# Patient Record
Sex: MALE | Race: ASIAN | NOT HISPANIC OR LATINO
[De-identification: names, ages, dates, MRNs, and addresses within clinical notes are randomized per-mention and may not be internally consistent; named-entity substitution may affect disease eponyms.]

---

## 2024-05-09 ENCOUNTER — RESULT REVIEW (OUTPATIENT)
Age: 39
End: 2024-05-09

## 2024-05-09 ENCOUNTER — INPATIENT (INPATIENT)
Facility: HOSPITAL | Age: 39
LOS: 14 days | Discharge: HOME | End: 2024-05-24
Attending: OTOLARYNGOLOGY | Admitting: OTOLARYNGOLOGY
Payer: MEDICAID

## 2024-05-09 ENCOUNTER — EMERGENCY (EMERGENCY)
Facility: HOSPITAL | Age: 39
LOS: 1 days | Discharge: ANOTHER HOSPITAL | End: 2024-05-09
Attending: EMERGENCY MEDICINE
Payer: COMMERCIAL

## 2024-05-09 VITALS
DIASTOLIC BLOOD PRESSURE: 67 MMHG | WEIGHT: 147.71 LBS | OXYGEN SATURATION: 98 % | TEMPERATURE: 98 F | RESPIRATION RATE: 17 BRPM | HEART RATE: 70 BPM | SYSTOLIC BLOOD PRESSURE: 116 MMHG | HEIGHT: 68 IN

## 2024-05-09 VITALS
DIASTOLIC BLOOD PRESSURE: 75 MMHG | RESPIRATION RATE: 16 BRPM | HEART RATE: 60 BPM | SYSTOLIC BLOOD PRESSURE: 128 MMHG | TEMPERATURE: 98 F | OXYGEN SATURATION: 98 %

## 2024-05-09 VITALS
SYSTOLIC BLOOD PRESSURE: 124 MMHG | OXYGEN SATURATION: 99 % | TEMPERATURE: 98 F | DIASTOLIC BLOOD PRESSURE: 79 MMHG | RESPIRATION RATE: 18 BRPM | HEART RATE: 66 BPM

## 2024-05-09 DIAGNOSIS — R22.0 LOCALIZED SWELLING, MASS AND LUMP, HEAD: ICD-10-CM

## 2024-05-09 LAB
ALBUMIN SERPL ELPH-MCNC: 3.6 G/DL — SIGNIFICANT CHANGE UP (ref 3.5–5)
ALBUMIN SERPL ELPH-MCNC: 4.4 G/DL — SIGNIFICANT CHANGE UP (ref 3.3–5)
ALP SERPL-CCNC: 87 U/L — SIGNIFICANT CHANGE UP (ref 40–120)
ALP SERPL-CCNC: 90 U/L — SIGNIFICANT CHANGE UP (ref 40–120)
ALT FLD-CCNC: 27 U/L — SIGNIFICANT CHANGE UP (ref 4–41)
ALT FLD-CCNC: 37 U/L DA — SIGNIFICANT CHANGE UP (ref 10–60)
ANION GAP SERPL CALC-SCNC: 13 MMOL/L — SIGNIFICANT CHANGE UP (ref 7–14)
ANION GAP SERPL CALC-SCNC: 4 MMOL/L — LOW (ref 5–17)
APTT BLD: 33.7 SEC — SIGNIFICANT CHANGE UP (ref 24.5–35.6)
AST SERPL-CCNC: 20 U/L — SIGNIFICANT CHANGE UP (ref 10–40)
AST SERPL-CCNC: 21 U/L — SIGNIFICANT CHANGE UP (ref 4–40)
BASOPHILS # BLD AUTO: 0.03 K/UL — SIGNIFICANT CHANGE UP (ref 0–0.2)
BASOPHILS # BLD AUTO: 0.03 K/UL — SIGNIFICANT CHANGE UP (ref 0–0.2)
BASOPHILS NFR BLD AUTO: 0.5 % — SIGNIFICANT CHANGE UP (ref 0–2)
BASOPHILS NFR BLD AUTO: 0.5 % — SIGNIFICANT CHANGE UP (ref 0–2)
BILIRUB SERPL-MCNC: 0.4 MG/DL — SIGNIFICANT CHANGE UP (ref 0.2–1.2)
BILIRUB SERPL-MCNC: 0.4 MG/DL — SIGNIFICANT CHANGE UP (ref 0.2–1.2)
BLD GP AB SCN SERPL QL: NEGATIVE — SIGNIFICANT CHANGE UP
BLD GP AB SCN SERPL QL: SIGNIFICANT CHANGE UP
BUN SERPL-MCNC: 7 MG/DL — SIGNIFICANT CHANGE UP (ref 7–23)
BUN SERPL-MCNC: 8 MG/DL — SIGNIFICANT CHANGE UP (ref 7–18)
CALCIUM SERPL-MCNC: 8.6 MG/DL — SIGNIFICANT CHANGE UP (ref 8.4–10.5)
CALCIUM SERPL-MCNC: 9 MG/DL — SIGNIFICANT CHANGE UP (ref 8.4–10.5)
CHLORIDE SERPL-SCNC: 109 MMOL/L — HIGH (ref 98–107)
CHLORIDE SERPL-SCNC: 113 MMOL/L — HIGH (ref 96–108)
CO2 SERPL-SCNC: 20 MMOL/L — LOW (ref 22–31)
CO2 SERPL-SCNC: 25 MMOL/L — SIGNIFICANT CHANGE UP (ref 22–31)
CREAT SERPL-MCNC: 0.85 MG/DL — SIGNIFICANT CHANGE UP (ref 0.5–1.3)
CREAT SERPL-MCNC: 0.95 MG/DL — SIGNIFICANT CHANGE UP (ref 0.5–1.3)
EGFR: 105 ML/MIN/1.73M2 — SIGNIFICANT CHANGE UP
EGFR: 114 ML/MIN/1.73M2 — SIGNIFICANT CHANGE UP
EOSINOPHIL # BLD AUTO: 0.11 K/UL — SIGNIFICANT CHANGE UP (ref 0–0.5)
EOSINOPHIL # BLD AUTO: 0.17 K/UL — SIGNIFICANT CHANGE UP (ref 0–0.5)
EOSINOPHIL NFR BLD AUTO: 1.8 % — SIGNIFICANT CHANGE UP (ref 0–6)
EOSINOPHIL NFR BLD AUTO: 2.6 % — SIGNIFICANT CHANGE UP (ref 0–6)
GLUCOSE SERPL-MCNC: 90 MG/DL — SIGNIFICANT CHANGE UP (ref 70–99)
GLUCOSE SERPL-MCNC: 98 MG/DL — SIGNIFICANT CHANGE UP (ref 70–99)
HCT VFR BLD CALC: 43.2 % — SIGNIFICANT CHANGE UP (ref 39–50)
HCT VFR BLD CALC: 43.9 % — SIGNIFICANT CHANGE UP (ref 39–50)
HGB BLD-MCNC: 13.7 G/DL — SIGNIFICANT CHANGE UP (ref 13–17)
HGB BLD-MCNC: 14.4 G/DL — SIGNIFICANT CHANGE UP (ref 13–17)
IANC: 3.41 K/UL — SIGNIFICANT CHANGE UP (ref 1.8–7.4)
IMM GRANULOCYTES NFR BLD AUTO: 0.2 % — SIGNIFICANT CHANGE UP (ref 0–0.9)
IMM GRANULOCYTES NFR BLD AUTO: 0.2 % — SIGNIFICANT CHANGE UP (ref 0–0.9)
INR BLD: 1.15 RATIO — SIGNIFICANT CHANGE UP (ref 0.85–1.18)
INR BLD: 1.34 RATIO — HIGH (ref 0.85–1.18)
LYMPHOCYTES # BLD AUTO: 1.85 K/UL — SIGNIFICANT CHANGE UP (ref 1–3.3)
LYMPHOCYTES # BLD AUTO: 1.88 K/UL — SIGNIFICANT CHANGE UP (ref 1–3.3)
LYMPHOCYTES # BLD AUTO: 28.6 % — SIGNIFICANT CHANGE UP (ref 13–44)
LYMPHOCYTES # BLD AUTO: 30.8 % — SIGNIFICANT CHANGE UP (ref 13–44)
MCHC RBC-ENTMCNC: 26.7 PG — LOW (ref 27–34)
MCHC RBC-ENTMCNC: 27.2 PG — SIGNIFICANT CHANGE UP (ref 27–34)
MCHC RBC-ENTMCNC: 31.7 GM/DL — LOW (ref 32–36)
MCHC RBC-ENTMCNC: 32.8 GM/DL — SIGNIFICANT CHANGE UP (ref 32–36)
MCV RBC AUTO: 82.8 FL — SIGNIFICANT CHANGE UP (ref 80–100)
MCV RBC AUTO: 84 FL — SIGNIFICANT CHANGE UP (ref 80–100)
MONOCYTES # BLD AUTO: 0.66 K/UL — SIGNIFICANT CHANGE UP (ref 0–0.9)
MONOCYTES # BLD AUTO: 0.72 K/UL — SIGNIFICANT CHANGE UP (ref 0–0.9)
MONOCYTES NFR BLD AUTO: 10.8 % — SIGNIFICANT CHANGE UP (ref 2–14)
MONOCYTES NFR BLD AUTO: 11.1 % — SIGNIFICANT CHANGE UP (ref 2–14)
NEUTROPHILS # BLD AUTO: 3.41 K/UL — SIGNIFICANT CHANGE UP (ref 1.8–7.4)
NEUTROPHILS # BLD AUTO: 3.69 K/UL — SIGNIFICANT CHANGE UP (ref 1.8–7.4)
NEUTROPHILS NFR BLD AUTO: 55.9 % — SIGNIFICANT CHANGE UP (ref 43–77)
NEUTROPHILS NFR BLD AUTO: 57 % — SIGNIFICANT CHANGE UP (ref 43–77)
NRBC # BLD: 0 /100 WBCS — SIGNIFICANT CHANGE UP (ref 0–0)
NRBC # BLD: 0 /100 WBCS — SIGNIFICANT CHANGE UP (ref 0–0)
NRBC # FLD: 0 K/UL — SIGNIFICANT CHANGE UP (ref 0–0)
PLATELET # BLD AUTO: 174 K/UL — SIGNIFICANT CHANGE UP (ref 150–400)
PLATELET # BLD AUTO: 190 K/UL — SIGNIFICANT CHANGE UP (ref 150–400)
POTASSIUM SERPL-MCNC: 3.9 MMOL/L — SIGNIFICANT CHANGE UP (ref 3.5–5.3)
POTASSIUM SERPL-MCNC: 4 MMOL/L — SIGNIFICANT CHANGE UP (ref 3.5–5.3)
POTASSIUM SERPL-SCNC: 3.9 MMOL/L — SIGNIFICANT CHANGE UP (ref 3.5–5.3)
POTASSIUM SERPL-SCNC: 4 MMOL/L — SIGNIFICANT CHANGE UP (ref 3.5–5.3)
PROT SERPL-MCNC: 7.1 G/DL — SIGNIFICANT CHANGE UP (ref 6–8.3)
PROT SERPL-MCNC: 7.1 G/DL — SIGNIFICANT CHANGE UP (ref 6–8.3)
PROTHROM AB SERPL-ACNC: 12.8 SEC — SIGNIFICANT CHANGE UP (ref 9.5–13)
PROTHROM AB SERPL-ACNC: 15.1 SEC — HIGH (ref 9.5–13)
RBC # BLD: 5.14 M/UL — SIGNIFICANT CHANGE UP (ref 4.2–5.8)
RBC # BLD: 5.3 M/UL — SIGNIFICANT CHANGE UP (ref 4.2–5.8)
RBC # FLD: 12.5 % — SIGNIFICANT CHANGE UP (ref 10.3–14.5)
RBC # FLD: 12.5 % — SIGNIFICANT CHANGE UP (ref 10.3–14.5)
RH IG SCN BLD-IMP: POSITIVE — SIGNIFICANT CHANGE UP
SODIUM SERPL-SCNC: 142 MMOL/L — SIGNIFICANT CHANGE UP (ref 135–145)
SODIUM SERPL-SCNC: 142 MMOL/L — SIGNIFICANT CHANGE UP (ref 135–145)
WBC # BLD: 6.1 K/UL — SIGNIFICANT CHANGE UP (ref 3.8–10.5)
WBC # BLD: 6.47 K/UL — SIGNIFICANT CHANGE UP (ref 3.8–10.5)
WBC # FLD AUTO: 6.1 K/UL — SIGNIFICANT CHANGE UP (ref 3.8–10.5)
WBC # FLD AUTO: 6.47 K/UL — SIGNIFICANT CHANGE UP (ref 3.8–10.5)

## 2024-05-09 PROCEDURE — 85025 COMPLETE CBC W/AUTO DIFF WBC: CPT

## 2024-05-09 PROCEDURE — 86900 BLOOD TYPING SEROLOGIC ABO: CPT

## 2024-05-09 PROCEDURE — 99285 EMERGENCY DEPT VISIT HI MDM: CPT

## 2024-05-09 PROCEDURE — 86850 RBC ANTIBODY SCREEN: CPT

## 2024-05-09 PROCEDURE — 80053 COMPREHEN METABOLIC PANEL: CPT

## 2024-05-09 PROCEDURE — 88305 TISSUE EXAM BY PATHOLOGIST: CPT | Mod: 26

## 2024-05-09 PROCEDURE — 86901 BLOOD TYPING SEROLOGIC RH(D): CPT

## 2024-05-09 PROCEDURE — 85610 PROTHROMBIN TIME: CPT

## 2024-05-09 PROCEDURE — 70491 CT SOFT TISSUE NECK W/DYE: CPT | Mod: 26,MC

## 2024-05-09 PROCEDURE — 71260 CT THORAX DX C+: CPT | Mod: 26

## 2024-05-09 PROCEDURE — 36415 COLL VENOUS BLD VENIPUNCTURE: CPT

## 2024-05-09 PROCEDURE — 88331 PATH CONSLTJ SURG 1 BLK 1SPC: CPT | Mod: 26

## 2024-05-09 PROCEDURE — 70491 CT SOFT TISSUE NECK W/DYE: CPT | Mod: MC

## 2024-05-09 RX ORDER — OXYCODONE HYDROCHLORIDE 5 MG/1
2.5 TABLET ORAL EVERY 6 HOURS
Refills: 0 | Status: DISCONTINUED | OUTPATIENT
Start: 2024-05-09 | End: 2024-05-12

## 2024-05-09 RX ORDER — ENOXAPARIN SODIUM 100 MG/ML
30 INJECTION SUBCUTANEOUS EVERY 24 HOURS
Refills: 0 | Status: DISCONTINUED | OUTPATIENT
Start: 2024-05-09 | End: 2024-05-09

## 2024-05-09 RX ORDER — ENOXAPARIN SODIUM 100 MG/ML
40 INJECTION SUBCUTANEOUS EVERY 24 HOURS
Refills: 0 | Status: DISCONTINUED | OUTPATIENT
Start: 2024-05-09 | End: 2024-05-16

## 2024-05-09 RX ORDER — ACETAMINOPHEN 500 MG
650 TABLET ORAL EVERY 6 HOURS
Refills: 0 | Status: DISCONTINUED | OUTPATIENT
Start: 2024-05-09 | End: 2024-05-12

## 2024-05-09 RX ORDER — LANOLIN ALCOHOL/MO/W.PET/CERES
3 CREAM (GRAM) TOPICAL AT BEDTIME
Refills: 0 | Status: DISCONTINUED | OUTPATIENT
Start: 2024-05-09 | End: 2024-05-16

## 2024-05-09 RX ORDER — OXYCODONE HYDROCHLORIDE 5 MG/1
5 TABLET ORAL EVERY 6 HOURS
Refills: 0 | Status: DISCONTINUED | OUTPATIENT
Start: 2024-05-09 | End: 2024-05-12

## 2024-05-09 RX ADMIN — OXYCODONE HYDROCHLORIDE 5 MILLIGRAM(S): 5 TABLET ORAL at 21:30

## 2024-05-09 RX ADMIN — OXYCODONE HYDROCHLORIDE 5 MILLIGRAM(S): 5 TABLET ORAL at 20:34

## 2024-05-09 NOTE — ED PROVIDER NOTE - OBJECTIVE STATEMENT
38 yr old male smoker presents to ed c/o over 2 weeks of left facial pain. pt went to hospital in [facility] and had dx of oral carcinoma of left. per outside note- dated 5/3/24- 1 month of left facial pain and trismus and had CT face- left buccal thickening with focal mass. no dental procedure.

## 2024-05-09 NOTE — ED PROVIDER NOTE - PROGRESS NOTE DETAILS
Cl[place]: pt ct done. spoke with ent as pt has no follow up and will sent to [facility]

## 2024-05-09 NOTE — H&P ADULT - NSHPLABSRESULTS_GEN_ALL_CORE
LABS:                        14.4   6.10  )-----------( 174      ( 09 May 2024 15:59 )             43.9     05-09    142  |  109<H>  |  x   ----------------------------<  x   3.9   |  x   |  x         PT/INR - ( 09 May 2024 15:59 )   PT: 12.8 sec;   INR: 1.15 ratio         PTT - ( 09 May 2024 15:59 )  PTT:33.7 sec

## 2024-05-09 NOTE — ED PROVIDER NOTE - OBJECTIVE STATEMENT
38yyo M transferred from [facility] for buccal mass. pt was presenting WITH  months of left facial pain, pain with chewing, was founf to have left buccal mass and was sent for ent eval, biopsy and admission  pt tolerating secretions and PO.   no fevers, + weight loss  has history of chewing tobacco   pt does not have insurance so would unlikely be able to fu as outpt so was transferred for further eval   [facility]  [id number]

## 2024-05-09 NOTE — ED ADULT TRIAGE NOTE - CHIEF COMPLAINT QUOTE
pt transferred from [facility] for ENT evaluation. pt c/o left facial pain x 1 month. CT scan showed left buccal thickening with focal mass.

## 2024-05-09 NOTE — ED ADULT NURSE REASSESSMENT NOTE - NS ED NURSE REASSESS COMMENT FT1
endorsed to Sofia RICKS for transfer to [facility]. Pt currently comfortable, in NAD. Will continue to monitor.

## 2024-05-09 NOTE — ED PROVIDER NOTE - CLINICAL SUMMARY MEDICAL DECISION MAKING FREE TEXT BOX
38 yr old male smoker presents to ed c/o over 2 weeks of left facial pain. pt went to hospital in [facility] and had dx of oral carcinoma of left. per outside note- dated 5/3/24- 1 month of left facial pain and trismus and had CT face- left buccal thickening with focal mass. no dental procedure.     parotid gland mass- ct, labs, ent

## 2024-05-09 NOTE — H&P ADULT - HISTORY OF PRESENT ILLNESS
BERNA BENDER  is a 38y Male with no significant PMH who presented to an OSH today for complaints of L sided jaw and face pain. Pt endorses this pain for the past 2 months but reports he was in [place] so did not seek care. Pt reports he has had difficulty eating as chewing is painful for him. Pt has difficulty opening his mouth. Pt does not follow with a PCP or dentist. He has no difficulty swallowing. Pt reports a 6-7kg weight loss over the past few months. Pt endorses using chewing tobacco and stopped 25 days ago. Pt used to drink alcohol but reports he does not any longer. CT Head and Neck showing mass involving buccal mucosa extending into the area of the parotid.

## 2024-05-09 NOTE — H&P ADULT - NSHPPHYSICALEXAM_GEN_ALL_CORE
PHYSICAL EXAMINATION:  General: well-developed, NAD  OU: EOMI; PERRL; no drainage or redness  AU: external ears normal  Nose: nares patent  Mouth: Trismus, L buccal ulcerative mass extending from lower gingiva to upper gingiva, involving retromolar trigone  Neck: trachea midline, no palpable LAD  Respiratory: unlabored respirations  Cardiovascular: regular rate  Gastrointestinal: Soft, nondistended  Extremities: No edema, warm and well perfused  Skin: No lesions; no rash

## 2024-05-09 NOTE — ED ADULT NURSE NOTE - OBJECTIVE STATEMENT
presents w 1 month L facial pain. presented to [place] ED found to have L facial mass on CT. referred here for ENT. denies fever/chills, n/v/d. no drainage. speaking in complete sentences, no drooling noted. ENT at bedside eval now.

## 2024-05-09 NOTE — ED PROVIDER NOTE - ENMT, MLM
Airway patent, Nasal mucosa clear. Mouth left parotid mass. able to open mouth 2 finger breath only. Throat has no vesicles, no oropharyngeal exudates and uvula is midline.

## 2024-05-09 NOTE — H&P ADULT - ASSESSMENT
BERNA BENDER  is a 38y Male with no significant PMH who presented to an OSH today for complaints of L sided jaw and face pain found to have ulcerative L sided buccal mucosal lesion that on frozen section was found to be invasive SCC.    Plan:  - Admit to ENT  - Plan for OR next week for resection  - Medicine consult for pre-op optimization  - Pre-op labs  - Fu CT chest   - Puree diet

## 2024-05-09 NOTE — ED ADULT NURSE NOTE - CHIEF COMPLAINT QUOTE
pt transferred from [facility] for ENT evaluation. pt c/o left facial pain x 1 month. CT scan showed left buccal thickening with focal mass.

## 2024-05-09 NOTE — ED PROVIDER NOTE - PHYSICAL EXAMINATION
Gen: Well appearing in NAD  Head: NC/AT  Neck: trachea midline  difficulty opening moth fully  ulcerated mass notd on left inner cheek   Resp:  No distress  Ext: no deformities  Neuro:  A&O appears non focal  Skin:  Warm and dry as visualized  Psych:  Normal affect and mood

## 2024-05-09 NOTE — ED ADULT NURSE NOTE - NSFALLUNIVINTERV_ED_ALL_ED
Ears: no ear pain and no hearing problems. Nose: no nasal congestion and no nasal drainage. Mouth/Throat: no dysphagia, no hoarseness and no throat pain. Neck: no lumps, no pain, no stiffness and no swollen glands. Bed/Stretcher in lowest position, wheels locked, appropriate side rails in place/Call bell, personal items and telephone in reach/Instruct patient to call for assistance before getting out of bed/chair/stretcher/Non-slip footwear applied when patient is off stretcher/[place] to call system/Physically safe environment - no spills, clutter or unnecessary equipment/Purposeful proactive rounding/Room/bathroom lighting operational, light cord in reach

## 2024-05-09 NOTE — ED ADULT NURSE NOTE - OBJECTIVE STATEMENT
pt presents co L cheek/facial swelling with slight discomfort. At baseline pt is AAOx3, speaking in clear and complete sentences.

## 2024-05-10 DIAGNOSIS — C41.1 MALIGNANT NEOPLASM OF MANDIBLE: ICD-10-CM

## 2024-05-10 PROCEDURE — 99222 1ST HOSP IP/OBS MODERATE 55: CPT

## 2024-05-10 PROCEDURE — 93010 ELECTROCARDIOGRAM REPORT: CPT

## 2024-05-10 PROCEDURE — 31575 DIAGNOSTIC LARYNGOSCOPY: CPT | Mod: GC

## 2024-05-10 RX ADMIN — ENOXAPARIN SODIUM 40 MILLIGRAM(S): 100 INJECTION SUBCUTANEOUS at 05:11

## 2024-05-10 NOTE — CONSULT NOTE ADULT - ASSESSMENT
37 yo M with no sig pmh with newly diagnosed invasive SCC with plan for resection and free flap by ENT next week.

## 2024-05-10 NOTE — CONSULT NOTE ADULT - PROBLEM SELECTOR RECOMMENDATION 9
- ENT planning for surgical resection with free flap next week  - On oxycodone for pain  - PREOP EVALUATION: Pt has no cardiac history, no recent anginal symptoms. Endorses tobacco use but not other sig risk factors. No acute ischemic changes on EKG, RCRI 0; Gonzalez 0%. Pt is at low risk of myocardial infarction or cardiac arrest, intraoperatively or up to 30 days post-op.  He is currently optimized for this surgery. No further testing needed - Newly diagnosed SCC of mandible. CT chest neg for mets  - ENT planning for surgical resection with free flap next week  - On oxycodone for pain  - PREOP EVALUATION: Pt has no cardiac history, no recent anginal symptoms. Endorses tobacco use but not other sig risk factors. No acute ischemic changes on EKG, RCRI 0; Gonzalez 0%. Pt is at low risk of myocardial infarction or cardiac arrest, intraoperatively or up to 30 days post-op.  He is currently optimized for this surgery. No further testing needed

## 2024-05-10 NOTE — CONSULT NOTE ADULT - SUBJECTIVE AND OBJECTIVE BOX
Patient is a 38y old  Male who presents with a chief complaint of     HPI:  39 yo M with no sig pmh initially presented at OSH with 1 month L sided jaw and face pain. He reports having difficulty chewing due to the pain and lost 7kg over the past few months. CT Head and Neck showing mass involving buccal mucosa extending into the area of the parotid, found to be invasive SCC. Pt admitted to ENT service for surgical resection and free flap next week. Medicine consulted for preop eval. Pt denies any cardiac history, no prior episode of chest pain or other anginal symptoms. Denies any limitation in exercise tolerance. No family history of heart disease      Allergies    Allergy Status Unknown    Intolerances        HOME MEDICATIONS: No home medication    MEDICATIONS  (STANDING):  enoxaparin Injectable 40 milliGRAM(s) SubCutaneous every 24 hours    MEDICATIONS  (PRN):  acetaminophen     Tablet .. 650 milliGRAM(s) Oral every 6 hours PRN Mild Pain (1 - 3)  melatonin 3 milliGRAM(s) Oral at bedtime PRN Sleep  oxyCODONE    IR 5 milliGRAM(s) Oral every 6 hours PRN Severe Pain (7 - 10)  oxyCODONE    IR 2.5 milliGRAM(s) Oral every 6 hours PRN Moderate Pain (4 - 6)      PAST MEDICAL & SURGICAL HISTORY:  No pertinent past medical history      [ ] Reviewed     SOCIAL HISTORY:  Residence: [ ] [facility]  [ ] SNF  [x ] Community  [ ] Substance abuse: No  [ ] Tobacco: Pt chews tobacco daily  [ ] Alcohol use: No, last drink 15 days ago  Lives with friends   Not employed       FAMILY HISTORY:  [ x] No pertinent family history in first degree relatives   No family h/o heart disease or stroke    REVIEW OF SYSTEMS:    CONSTITUTIONAL: No fever, weight loss, or fatigue  EYES: No eye pain, visual disturbances, or discharge  ENMT:  No difficulty hearing, tinnitus, vertigo; No sinus or throat pain  NECK: No pain or stiffness  BREASTS: No pain, masses, or nipple discharge  RESPIRATORY: No cough, wheezing, chills or hemoptysis; No shortness of breath  CARDIOVASCULAR: No chest pain, palpitations, dizziness, or leg swelling  GASTROINTESTINAL: No abdominal or epigastric pain. No nausea, vomiting, or hematemesis; No diarrhea or constipation. No melena or hematochezia.  GENITOURINARY: No dysuria, frequency, hematuria, or incontinence  NEUROLOGICAL: No headaches, memory loss, loss of strength, numbness, or tremors  SKIN: No itching, burning, rashes, or lesions   LYMPH NODES: No enlarged glands  ENDOCRINE: No heat or cold intolerance; No hair loss  MUSCULOSKELETAL: No muscle or back pain  PSYCHIATRIC: No depression, anxiety, mood swings, or difficulty sleeping  HEME/LYMPH: No easy bruising, or bleeding gums  ALLERGY AND IMMUNOLOGIC: No hives or eczema    [  ] All other ROS negative  [  ] Unable to obtain due to poor mental status    Vital Signs Last 24 Hrs  T(C): 36.9 (10 May 2024 10:00), Max: 37 (10 May 2024 05:10)  T(F): 98.5 (10 May 2024 10:00), Max: 98.6 (10 May 2024 05:10)  HR: 72 (10 May 2024 10:00) (66 - 75)  BP: 120/82 (10 May 2024 10:00) (104/63 - 124/79)  BP(mean): --  RR: 18 (10 May 2024 10:00) (16 - 18)  SpO2: 100% (10 May 2024 10:00) (97% - 100%)    Parameters below as of 10 May 2024 10:00  Patient On (Oxygen Delivery Method): room air        PHYSICAL EXAM:    GENERAL: NAD, well-groomed, well-developed  HEAD:  Atraumatic, Normocephalic  EYES: EOMI, PERRLA, conjunctiva and sclera clear  ENMT: Moist mucous membranes  NECK: Supple, No JVD  RESPIRATORY: Clear to auscultation bilaterally; No rales, rhonchi, wheezing, or rubs  CARDIOVASCULAR: Regular rate and rhythm; No murmurs, rubs, or gallops  GASTROINTESTINAL: Soft, Nontender, Nondistended; Bowel sounds present  GENITOURINARY: Not examined  EXTREMITIES:  2+ Peripheral Pulses, No clubbing, cyanosis, or edema  NERVOUS SYSTEM:  Alert & Oriented X3; Moving all 4 extremities; No gross sensory deficits  HEME/LYMPH: No lymphadenopathy noted  SKIN: No rashes or lesions; Incisions C/D/I    LABS:                        14.4   6.10  )-----------( 174      ( 09 May 2024 15:59 )             43.9     Hemoglobin: 14.4 g/dL (05-09 @ 15:59)    05-09    142  |  109<H>  |  7   ----------------------------<  90  3.9   |  20<L>  |  0.85    Ca    9.0      09 May 2024 15:59    TPro  7.1  /  Alb  4.4  /  TBili  0.4  /  DBili  x   /  AST  21  /  ALT  27  /  AlkPhos  90  05-09    PT/INR - ( 09 May 2024 15:59 )   PT: 12.8 sec;   INR: 1.15 ratio         PTT - ( 09 May 2024 15:59 )  PTT:33.7 sec  Urinalysis Basic - ( 09 May 2024 15:59 )    Color: x / Appearance: x / SG: x / pH: x  Gluc: 90 mg/dL / Ketone: x  / Bili: x / Urobili: x   Blood: x / Protein: x / Nitrite: x   Leuk Esterase: x / RBC: x / WBC x   Sq Epi: x / Non Sq Epi: x / Bacteria: x      CAPILLARY BLOOD GLUCOSE        Microbiology   RADIOLOGY & ADDITIONAL STUDIES:    EKG:   Personally Reviewed:  [ ] YES     Imaging:   Personally Reviewed:  [ ] YES               [ ] Consultant(s) Notes Reviewed  [ ] Care Discussed with Consultants/Other Providers:    Advanced Directives: [ ] DNR  [ ] No feeding tube  [ ] MOLST in chart  [ ] MOLST completed today  [ ] Unknown    [ ] Probable osteoporosis  [ ] Possible osteomalacia  [ ] Increased delirium risk  [ ] Delirium and other risks can be reduced by:          -early ambulation          -minimizing "tethers" - IV, oxygen, catheters, etc          -avoiding hypnotics and sedatives          -maintaining hydration/nutrition          -avoid anticholinergics - diphenhydramine, etc          -pain control          -supportive environment

## 2024-05-10 NOTE — PROGRESS NOTE ADULT - SUBJECTIVE AND OBJECTIVE BOX
OTOLARYNGOLOGY (ENT) PROGRESS NOTE    PATIENT: BERNA BENDER  MRN: [id number]  : [birth year]  [id number]  DATE OF SERVICE:  05-10-24  			         ID:BERNA BENDER is a 38yMale with invasive SCC of the L buccal mucosa. Pt doing well with no issues at this time.      ALLERGIES:  Allergy Status Unknown      MEDICATIONS:  Antiinfectives:     IV fluids:    Hematologic/Anticoagulation:  enoxaparin Injectable 40 milliGRAM(s) SubCutaneous every 24 hours    Pain medications/Neuro:  acetaminophen     Tablet .. 650 milliGRAM(s) Oral every 6 hours PRN  melatonin 3 milliGRAM(s) Oral at bedtime PRN  oxyCODONE    IR 5 milliGRAM(s) Oral every 6 hours PRN  oxyCODONE    IR 2.5 milliGRAM(s) Oral every 6 hours PRN    Endocrine Medications:     All other standing medications:     All other PRN medications:    Vital Signs Last 24 Hrs  T(C): 37 (10 May 2024 05:10), Max: 37 (10 May 2024 05:10)  T(F): 98.6 (10 May 2024 05:10), Max: 98.6 (10 May 2024 05:10)  HR: 69 (10 May 2024 05:10) (66 - 75)  BP: 104/63 (10 May 2024 05:10) (104/63 - 124/79)  BP(mean): --  RR: 17 (10 May 2024 05:10) (16 - 18)  SpO2: 100% (10 May 2024 05:10) (97% - 100%)    Parameters below as of 10 May 2024 05:10  Patient On (Oxygen Delivery Method): room air           @ 07:01  -  05-10 @ 06:51  --------------------------------------------------------  IN:    Oral Fluid: 240 mL  Total IN: 240 mL    OUT:  Total OUT: 0 mL    Total NET: 240 mL                  PHYSICAL EXAM:  General: well-developed, NAD  OU: EOMI; PERRL; no drainage or redness  AU: external ears normal  Nose: nares patent  Mouth: L buccal mucosa ulcerative lesion, trismus  Neck: trachea midline  Respiratory: unlabored respirations  Cardiovascular: regular rate  Gastrointestinal: Soft, nondistended  Extremities: No edema, warm and well perfused  Skin: No lesions; no rash         LABS                       14.4   6.10  )-----------( 174      ( 09 May 2024 15:59 )             43.9        142  |  109<H>  |  7   ----------------------------<  90  3.9   |  20<L>  |  0.85    Ca    9.0      09 May 2024 15:59    TPro  7.1  /  Alb  4.4  /  TBili  0.4  /  DBili  x   /  AST  21  /  ALT  27  /  AlkPhos  90           Coagulation Studies-   PT/INR - ( 09 May 2024 15:59 )   PT: 12.8 sec;   INR: 1.15 ratio         PTT - ( 09 May 2024 15:59 )  PTT:33.7 sec  Urinalysis Basic - ( 09 May 2024 15:59 )    Color: x / Appearance: x / SG: x / pH: x  Gluc: 90 mg/dL / Ketone: x  / Bili: x / Urobili: x   Blood: x / Protein: x / Nitrite: x   Leuk Esterase: x / RBC: x / WBC x   Sq Epi: x / Non Sq Epi: x / Bacteria: x      Endocrine Panel-  Calcium: 9.0 mg/dL ( @ 15:59)        Assessment and Plan:  BERNA BENDER is a 38yMale w invasive SCC of L buccal mucosa.    PLAN:  - Medicine for pre-op clearance  - Follow-up tumor board discussion  - Plan for resection and free flap next week

## 2024-05-11 RX ADMIN — OXYCODONE HYDROCHLORIDE 5 MILLIGRAM(S): 5 TABLET ORAL at 06:46

## 2024-05-11 RX ADMIN — ENOXAPARIN SODIUM 40 MILLIGRAM(S): 100 INJECTION SUBCUTANEOUS at 05:20

## 2024-05-11 RX ADMIN — OXYCODONE HYDROCHLORIDE 2.5 MILLIGRAM(S): 5 TABLET ORAL at 01:13

## 2024-05-11 RX ADMIN — OXYCODONE HYDROCHLORIDE 2.5 MILLIGRAM(S): 5 TABLET ORAL at 00:43

## 2024-05-11 RX ADMIN — OXYCODONE HYDROCHLORIDE 5 MILLIGRAM(S): 5 TABLET ORAL at 07:16

## 2024-05-11 NOTE — PROGRESS NOTE ADULT - ASSESSMENT
37y/o M with newly diagnosed buccal SCC pending OR next week.   - CM/SW  - diet as tolerated   - OOBTC

## 2024-05-11 NOTE — PROGRESS NOTE ADULT - SUBJECTIVE AND OBJECTIVE BOX
ENT Progress Note    Interval: plan for OR next week, needs CM/SW for insurance       MEDICATIONS  (STANDING):  enoxaparin Injectable 40 milliGRAM(s) SubCutaneous every 24 hours    MEDICATIONS  (PRN):  acetaminophen     Tablet .. 650 milliGRAM(s) Oral every 6 hours PRN Mild Pain (1 - 3)  melatonin 3 milliGRAM(s) Oral at bedtime PRN Sleep  oxyCODONE    IR 5 milliGRAM(s) Oral every 6 hours PRN Severe Pain (7 - 10)  oxyCODONE    IR 2.5 milliGRAM(s) Oral every 6 hours PRN Moderate Pain (4 - 6)        Vital Signs Last 24 Hrs  T(C): 36.7 (11 May 2024 06:06), Max: 36.9 (10 May 2024 10:00)  T(F): 98.1 (11 May 2024 06:06), Max: 98.5 (10 May 2024 10:00)  HR: 68 (11 May 2024 06:06) (63 - 76)  BP: 116/67 (11 May 2024 06:06) (110/78 - 132/82)  BP(mean): --  RR: 18 (11 May 2024 06:06) (17 - 18)  SpO2: 98% (11 May 2024 06:06) (98% - 100%)    Parameters below as of 11 May 2024 06:06  Patient On (Oxygen Delivery Method): room air        Physical Exam:  General: well-developed, NAD  Mouth: L buccal mucosa ulcerative lesion, trismus  Neck: trachea midline  Respiratory: unlabored respirations      05-10-24 @ 07:01  -  05-11-24 @ 07:00  --------------------------------------------------------  IN: 740 mL / OUT: 0 mL / NET: 740 mL                              14.4   6.10  )-----------( 174      ( 09 May 2024 15:59 )             43.9    05-09    142  |  109<H>  |  7   ----------------------------<  90  3.9   |  20<L>  |  0.85    Ca    9.0      09 May 2024 15:59    TPro  7.1  /  Alb  4.4  /  TBili  0.4  /  DBili  x   /  AST  21  /  ALT  27  /  AlkPhos  90  05-09   PT/INR - ( 09 May 2024 15:59 )   PT: 12.8 sec;   INR: 1.15 ratio         PTT - ( 09 May 2024 15:59 )  PTT:33.7 sec

## 2024-05-12 RX ORDER — POLYETHYLENE GLYCOL 3350 17 G/17G
17 POWDER, FOR SOLUTION ORAL DAILY
Refills: 0 | Status: DISCONTINUED | OUTPATIENT
Start: 2024-05-12 | End: 2024-05-16

## 2024-05-12 RX ORDER — ACETAMINOPHEN 500 MG
650 TABLET ORAL EVERY 6 HOURS
Refills: 0 | Status: DISCONTINUED | OUTPATIENT
Start: 2024-05-12 | End: 2024-05-16

## 2024-05-12 RX ORDER — OXYCODONE HYDROCHLORIDE 5 MG/1
5 TABLET ORAL EVERY 6 HOURS
Refills: 0 | Status: DISCONTINUED | OUTPATIENT
Start: 2024-05-12 | End: 2024-05-16

## 2024-05-12 RX ORDER — GABAPENTIN 400 MG/1
100 CAPSULE ORAL THREE TIMES A DAY
Refills: 0 | Status: DISCONTINUED | OUTPATIENT
Start: 2024-05-12 | End: 2024-05-16

## 2024-05-12 RX ORDER — OXYCODONE HYDROCHLORIDE 5 MG/1
10 TABLET ORAL EVERY 6 HOURS
Refills: 0 | Status: DISCONTINUED | OUTPATIENT
Start: 2024-05-12 | End: 2024-05-16

## 2024-05-12 RX ADMIN — OXYCODONE HYDROCHLORIDE 5 MILLIGRAM(S): 5 TABLET ORAL at 03:28

## 2024-05-12 RX ADMIN — Medication 650 MILLIGRAM(S): at 18:00

## 2024-05-12 RX ADMIN — GABAPENTIN 100 MILLIGRAM(S): 400 CAPSULE ORAL at 13:39

## 2024-05-12 RX ADMIN — Medication 650 MILLIGRAM(S): at 22:55

## 2024-05-12 RX ADMIN — OXYCODONE HYDROCHLORIDE 5 MILLIGRAM(S): 5 TABLET ORAL at 02:58

## 2024-05-12 RX ADMIN — ENOXAPARIN SODIUM 40 MILLIGRAM(S): 100 INJECTION SUBCUTANEOUS at 05:06

## 2024-05-12 RX ADMIN — GABAPENTIN 100 MILLIGRAM(S): 400 CAPSULE ORAL at 22:12

## 2024-05-12 RX ADMIN — Medication 650 MILLIGRAM(S): at 11:44

## 2024-05-12 RX ADMIN — OXYCODONE HYDROCHLORIDE 10 MILLIGRAM(S): 5 TABLET ORAL at 10:15

## 2024-05-12 RX ADMIN — Medication 650 MILLIGRAM(S): at 22:13

## 2024-05-12 RX ADMIN — Medication 650 MILLIGRAM(S): at 17:12

## 2024-05-12 RX ADMIN — Medication 650 MILLIGRAM(S): at 12:30

## 2024-05-12 RX ADMIN — OXYCODONE HYDROCHLORIDE 10 MILLIGRAM(S): 5 TABLET ORAL at 09:29

## 2024-05-12 NOTE — PROGRESS NOTE ADULT - ASSESSMENT
37y/o M with newly diagnosed buccal SCC pending OR next week.   - CM/SW  - diet as tolerated   - will increase pain regimen   - OOBTC

## 2024-05-12 NOTE — PROGRESS NOTE ADULT - SUBJECTIVE AND OBJECTIVE BOX
ENT Progress Note    Interval:  Pt seen and examined at bedside, reports increased pain in cheek. has been tolerating diet.  plan for OR next week, needs CM/SW for insurance       MEDICATIONS  (STANDING):  enoxaparin Injectable 40 milliGRAM(s) SubCutaneous every 24 hours    MEDICATIONS  (PRN):  acetaminophen     Tablet .. 650 milliGRAM(s) Oral every 6 hours PRN Mild Pain (1 - 3)  melatonin 3 milliGRAM(s) Oral at bedtime PRN Sleep  oxyCODONE    IR 5 milliGRAM(s) Oral every 6 hours PRN Severe Pain (7 - 10)  oxyCODONE    IR 2.5 milliGRAM(s) Oral every 6 hours PRN Moderate Pain (4 - 6)        Vital Signs Last 24 Hrs  T(C): 36.7 (11 May 2024 06:06), Max: 36.9 (10 May 2024 10:00)  T(F): 98.1 (11 May 2024 06:06), Max: 98.5 (10 May 2024 10:00)  HR: 68 (11 May 2024 06:06) (63 - 76)  BP: 116/67 (11 May 2024 06:06) (110/78 - 132/82)  BP(mean): --  RR: 18 (11 May 2024 06:06) (17 - 18)  SpO2: 98% (11 May 2024 06:06) (98% - 100%)    Parameters below as of 11 May 2024 06:06  Patient On (Oxygen Delivery Method): room air        Physical Exam:  General: well-developed, NAD  Mouth: L buccal mucosa ulcerative lesion, trismus  Neck: trachea midline  Respiratory: unlabored respirations      05-10-24 @ 07:01  -  05-11-24 @ 07:00  --------------------------------------------------------  IN: 740 mL / OUT: 0 mL / NET: 740 mL                              14.4   6.10  )-----------( 174      ( 09 May 2024 15:59 )             43.9    05-09    142  |  109<H>  |  7   ----------------------------<  90  3.9   |  20<L>  |  0.85    Ca    9.0      09 May 2024 15:59    TPro  7.1  /  Alb  4.4  /  TBili  0.4  /  DBili  x   /  AST  21  /  ALT  27  /  AlkPhos  90  05-09   PT/INR - ( 09 May 2024 15:59 )   PT: 12.8 sec;   INR: 1.15 ratio         PTT - ( 09 May 2024 15:59 )  PTT:33.7 sec

## 2024-05-13 PROCEDURE — 99231 SBSQ HOSP IP/OBS SF/LOW 25: CPT

## 2024-05-13 RX ADMIN — ENOXAPARIN SODIUM 40 MILLIGRAM(S): 100 INJECTION SUBCUTANEOUS at 05:02

## 2024-05-13 RX ADMIN — POLYETHYLENE GLYCOL 3350 17 GRAM(S): 17 POWDER, FOR SOLUTION ORAL at 11:21

## 2024-05-13 RX ADMIN — OXYCODONE HYDROCHLORIDE 10 MILLIGRAM(S): 5 TABLET ORAL at 10:30

## 2024-05-13 RX ADMIN — Medication 650 MILLIGRAM(S): at 23:08

## 2024-05-13 RX ADMIN — GABAPENTIN 100 MILLIGRAM(S): 400 CAPSULE ORAL at 13:08

## 2024-05-13 RX ADMIN — Medication 650 MILLIGRAM(S): at 17:29

## 2024-05-13 RX ADMIN — OXYCODONE HYDROCHLORIDE 10 MILLIGRAM(S): 5 TABLET ORAL at 09:49

## 2024-05-13 RX ADMIN — OXYCODONE HYDROCHLORIDE 10 MILLIGRAM(S): 5 TABLET ORAL at 03:30

## 2024-05-13 RX ADMIN — GABAPENTIN 100 MILLIGRAM(S): 400 CAPSULE ORAL at 23:08

## 2024-05-13 RX ADMIN — Medication 650 MILLIGRAM(S): at 05:02

## 2024-05-13 RX ADMIN — Medication 650 MILLIGRAM(S): at 18:16

## 2024-05-13 RX ADMIN — Medication 650 MILLIGRAM(S): at 11:21

## 2024-05-13 RX ADMIN — OXYCODONE HYDROCHLORIDE 10 MILLIGRAM(S): 5 TABLET ORAL at 02:40

## 2024-05-13 RX ADMIN — Medication 650 MILLIGRAM(S): at 12:11

## 2024-05-13 RX ADMIN — GABAPENTIN 100 MILLIGRAM(S): 400 CAPSULE ORAL at 05:01

## 2024-05-13 NOTE — PROGRESS NOTE ADULT - SUBJECTIVE AND OBJECTIVE BOX
Patient is a 38y old  Male who presents with a chief complaint of Invasive SCC of mandible (10 May 2024 11:26)      SUBJECTIVE / OVERNIGHT EVENTS: No acute events overnight. Pt has no new complaints     ADDITIONAL REVIEW OF SYSTEMS:    MEDICATIONS  (STANDING):  acetaminophen     Tablet .. 650 milliGRAM(s) Oral every 6 hours  enoxaparin Injectable 40 milliGRAM(s) SubCutaneous every 24 hours  gabapentin 100 milliGRAM(s) Oral three times a day  polyethylene glycol 3350 17 Gram(s) Oral daily    MEDICATIONS  (PRN):  melatonin 3 milliGRAM(s) Oral at bedtime PRN Sleep  oxyCODONE    IR 5 milliGRAM(s) Oral every 6 hours PRN Moderate Pain (4 - 6)  oxyCODONE    IR 10 milliGRAM(s) Oral every 6 hours PRN Severe Pain (7 - 10)      CAPILLARY BLOOD GLUCOSE        I&O's Summary    12 May 2024 07:01  -  13 May 2024 07:00  --------------------------------------------------------  IN: 320 mL / OUT: 0 mL / NET: 320 mL        PHYSICAL EXAM:  Vital Signs Last 24 Hrs  T(C): 36.6 (13 May 2024 09:23), Max: 36.8 (13 May 2024 02:00)  T(F): 97.9 (13 May 2024 09:23), Max: 98.2 (13 May 2024 02:00)  HR: 78 (13 May 2024 09:23) (59 - 78)  BP: 118/83 (13 May 2024 09:23) (100/67 - 124/67)  BP(mean): --  RR: 18 (13 May 2024 09:23) (17 - 18)  SpO2: 99% (13 May 2024 09:23) (98% - 100%)    Parameters below as of 13 May 2024 09:23  Patient On (Oxygen Delivery Method): room air      CONSTITUTIONAL: NAD  EYES: PERRLA; conjunctiva and sclera clear  ENMT: Moist oral mucosa, no pharyngeal injection or exudates; normal dentition  NECK: Supple, no palpable masses; no thyromegaly  RESPIRATORY: Normal respiratory effort; lungs are clear to auscultation bilaterally  CARDIOVASCULAR: Regular rate and rhythm, normal S1 and S2, no murmur/rub/gallop; No lower extremity edema; Peripheral pulses are 2+ bilaterally  ABDOMEN: Nontender to palpation, normoactive bowel sounds, no rebound/guarding; No hepatosplenomegaly  MUSCULOSKELETAL:  Normal gait; no clubbing or cyanosis of digits; no joint swelling or tenderness to palpation  PSYCH: A+O to person, place, and time; affect appropriate  NEUROLOGY: CN 2-12 are intact and symmetric; no gross sensory deficits   SKIN: No rashes; no palpable lesions    LABS:                      RADIOLOGY & ADDITIONAL TESTS:  Results Reviewed:   Imaging Personally Reviewed:  Electrocardiogram Personally Reviewed:    COORDINATION OF CARE:  Care Discussed with Consultants/Other Providers [Y/N]:  Prior or Outpatient Records Reviewed [Y/N]:

## 2024-05-13 NOTE — PROGRESS NOTE ADULT - ASSESSMENT
39 yo M with no sig pmh with newly diagnosed invasive SCC with plan for resection and free flap by ENT next week.

## 2024-05-13 NOTE — PROGRESS NOTE ADULT - PROBLEM SELECTOR PLAN 1
- Newly diagnosed SCC of mandible. CT chest neg for mets  - ENT planning for surgical resection with free flap next week  - On oxycodone for pain  - PREOP EVALUATION: Pt has no cardiac history, no recent anginal symptoms. Endorses tobacco use but not other sig risk factors. No acute ischemic changes on EKG, RCRI 0; Gonzalez 0%. Pt is at low risk of myocardial infarction or cardiac arrest, intraoperatively or up to 30 days post-op.  He is currently optimized for this surgery. No further testing needed.    No new labs, no other active issues. Will f/u postop. Please call if any acute issues

## 2024-05-13 NOTE — PROGRESS NOTE ADULT - SUBJECTIVE AND OBJECTIVE BOX
ENT Progress Note    Interval:  Pt seen and examined at bedside, reports increased pain in cheek. has been tolerating diet.  plan for OR Thursday, needs CM/SW for insurance       MEDICATIONS  (STANDING):  enoxaparin Injectable 40 milliGRAM(s) SubCutaneous every 24 hours    MEDICATIONS  (PRN):  acetaminophen     Tablet .. 650 milliGRAM(s) Oral every 6 hours PRN Mild Pain (1 - 3)  melatonin 3 milliGRAM(s) Oral at bedtime PRN Sleep  oxyCODONE    IR 5 milliGRAM(s) Oral every 6 hours PRN Severe Pain (7 - 10)  oxyCODONE    IR 2.5 milliGRAM(s) Oral every 6 hours PRN Moderate Pain (4 - 6)        Vital Signs Last 24 Hrs  T(C): 36.7 (13 May 2024 05:49), Max: 36.8 (13 May 2024 02:00)  T(F): 98 (13 May 2024 05:49), Max: 98.2 (13 May 2024 02:00)  HR: 69 (13 May 2024 05:49) (59 - 73)  BP: 117/65 (13 May 2024 05:49) (100/67 - 124/67)  BP(mean): --  RR: 17 (13 May 2024 05:49) (16 - 18)  SpO2: 99% (13 May 2024 05:49) (98% - 100%)    Parameters below as of 13 May 2024 05:49  Patient On (Oxygen Delivery Method): room air            Physical Exam:  General: well-developed, NAD  Mouth: L buccal mucosa ulcerative lesion, trismus  Neck: trachea midline  Respiratory: unlabored respirations      05-10-24 @ 07:01  -  05-11-24 @ 07:00  --------------------------------------------------------  IN: 740 mL / OUT: 0 mL / NET: 740 mL

## 2024-05-13 NOTE — PROGRESS NOTE ADULT - ASSESSMENT
37y/o M with newly diagnosed buccal SCC pending OR next week.   - CM/SW  - diet as tolerated   - pain control  - OOBTC

## 2024-05-14 RX ADMIN — OXYCODONE HYDROCHLORIDE 10 MILLIGRAM(S): 5 TABLET ORAL at 21:20

## 2024-05-14 RX ADMIN — OXYCODONE HYDROCHLORIDE 10 MILLIGRAM(S): 5 TABLET ORAL at 09:18

## 2024-05-14 RX ADMIN — Medication 650 MILLIGRAM(S): at 18:00

## 2024-05-14 RX ADMIN — ENOXAPARIN SODIUM 40 MILLIGRAM(S): 100 INJECTION SUBCUTANEOUS at 05:15

## 2024-05-14 RX ADMIN — Medication 650 MILLIGRAM(S): at 11:14

## 2024-05-14 RX ADMIN — OXYCODONE HYDROCHLORIDE 10 MILLIGRAM(S): 5 TABLET ORAL at 09:50

## 2024-05-14 RX ADMIN — OXYCODONE HYDROCHLORIDE 10 MILLIGRAM(S): 5 TABLET ORAL at 04:11

## 2024-05-14 RX ADMIN — OXYCODONE HYDROCHLORIDE 10 MILLIGRAM(S): 5 TABLET ORAL at 21:50

## 2024-05-14 RX ADMIN — Medication 650 MILLIGRAM(S): at 17:26

## 2024-05-14 RX ADMIN — OXYCODONE HYDROCHLORIDE 10 MILLIGRAM(S): 5 TABLET ORAL at 03:16

## 2024-05-14 RX ADMIN — GABAPENTIN 100 MILLIGRAM(S): 400 CAPSULE ORAL at 21:20

## 2024-05-14 RX ADMIN — GABAPENTIN 100 MILLIGRAM(S): 400 CAPSULE ORAL at 14:08

## 2024-05-14 RX ADMIN — GABAPENTIN 100 MILLIGRAM(S): 400 CAPSULE ORAL at 05:15

## 2024-05-14 RX ADMIN — Medication 650 MILLIGRAM(S): at 05:15

## 2024-05-14 RX ADMIN — Medication 650 MILLIGRAM(S): at 11:45

## 2024-05-14 NOTE — PROGRESS NOTE ADULT - SUBJECTIVE AND OBJECTIVE BOX
ENT Progress Note    Interval:  Pt seen and examined at bedside, reports increased pain in cheek. has been tolerating diet.  plan for OR Thursday. No complaints overnight.    MEDICATIONS  (STANDING):  enoxaparin Injectable 40 milliGRAM(s) SubCutaneous every 24 hours    MEDICATIONS  (PRN):  acetaminophen     Tablet .. 650 milliGRAM(s) Oral every 6 hours PRN Mild Pain (1 - 3)  melatonin 3 milliGRAM(s) Oral at bedtime PRN Sleep  oxyCODONE    IR 5 milliGRAM(s) Oral every 6 hours PRN Severe Pain (7 - 10)  oxyCODONE    IR 2.5 milliGRAM(s) Oral every 6 hours PRN Moderate Pain (4 - 6)        Vital Signs Last 24 Hrs  T(C): 36.8 (14 May 2024 05:57), Max: 37.1 (13 May 2024 18:29)  T(F): 98.3 (14 May 2024 05:57), Max: 98.8 (13 May 2024 18:29)  HR: 72 (14 May 2024 05:57) (68 - 79)  BP: 110/67 (14 May 2024 05:57) (96/65 - 118/83)  BP(mean): --  RR: 17 (14 May 2024 05:57) (17 - 18)  SpO2: 99% (14 May 2024 05:57) (97% - 100%)    Parameters below as of 14 May 2024 05:57  Patient On (Oxygen Delivery Method): room air            Physical Exam:  General: well-developed, NAD  Mouth: L buccal mucosa ulcerative lesion, trismus  Neck: trachea midline  Respiratory: unlabored respirations

## 2024-05-14 NOTE — PROGRESS NOTE ADULT - ASSESSMENT
37y/o M with newly diagnosed buccal SCC pending OR next week.   - CM/SW  - diet as tolerated   - pain control  - OOBTC   - OR Thursday

## 2024-05-15 ENCOUNTER — TRANSCRIPTION ENCOUNTER (OUTPATIENT)
Age: 39
End: 2024-05-15

## 2024-05-15 LAB
ANION GAP SERPL CALC-SCNC: 13 MMOL/L — SIGNIFICANT CHANGE UP (ref 7–14)
BLD GP AB SCN SERPL QL: NEGATIVE — SIGNIFICANT CHANGE UP
BUN SERPL-MCNC: 12 MG/DL — SIGNIFICANT CHANGE UP (ref 7–23)
CALCIUM SERPL-MCNC: 9.3 MG/DL — SIGNIFICANT CHANGE UP (ref 8.4–10.5)
CHLORIDE SERPL-SCNC: 99 MMOL/L — SIGNIFICANT CHANGE UP (ref 98–107)
CO2 SERPL-SCNC: 25 MMOL/L — SIGNIFICANT CHANGE UP (ref 22–31)
CREAT SERPL-MCNC: 0.93 MG/DL — SIGNIFICANT CHANGE UP (ref 0.5–1.3)
EGFR: 108 ML/MIN/1.73M2 — SIGNIFICANT CHANGE UP
GLUCOSE SERPL-MCNC: 79 MG/DL — SIGNIFICANT CHANGE UP (ref 70–99)
HCT VFR BLD CALC: 44.6 % — SIGNIFICANT CHANGE UP (ref 39–50)
HGB BLD-MCNC: 14.4 G/DL — SIGNIFICANT CHANGE UP (ref 13–17)
INR BLD: 1.22 RATIO — HIGH (ref 0.85–1.18)
MAGNESIUM SERPL-MCNC: 2.2 MG/DL — SIGNIFICANT CHANGE UP (ref 1.6–2.6)
MCHC RBC-ENTMCNC: 26.8 PG — LOW (ref 27–34)
MCHC RBC-ENTMCNC: 32.3 GM/DL — SIGNIFICANT CHANGE UP (ref 32–36)
MCV RBC AUTO: 83.1 FL — SIGNIFICANT CHANGE UP (ref 80–100)
NRBC # BLD: 0 /100 WBCS — SIGNIFICANT CHANGE UP (ref 0–0)
NRBC # FLD: 0 K/UL — SIGNIFICANT CHANGE UP (ref 0–0)
PHOSPHATE SERPL-MCNC: 3.2 MG/DL — SIGNIFICANT CHANGE UP (ref 2.5–4.5)
PLATELET # BLD AUTO: 243 K/UL — SIGNIFICANT CHANGE UP (ref 150–400)
POTASSIUM SERPL-MCNC: 3.8 MMOL/L — SIGNIFICANT CHANGE UP (ref 3.5–5.3)
POTASSIUM SERPL-SCNC: 3.8 MMOL/L — SIGNIFICANT CHANGE UP (ref 3.5–5.3)
PROTHROM AB SERPL-ACNC: 13.6 SEC — HIGH (ref 9.5–13)
RBC # BLD: 5.37 M/UL — SIGNIFICANT CHANGE UP (ref 4.2–5.8)
RBC # FLD: 11.9 % — SIGNIFICANT CHANGE UP (ref 10.3–14.5)
RH IG SCN BLD-IMP: POSITIVE — SIGNIFICANT CHANGE UP
SODIUM SERPL-SCNC: 137 MMOL/L — SIGNIFICANT CHANGE UP (ref 135–145)
WBC # BLD: 9.62 K/UL — SIGNIFICANT CHANGE UP (ref 3.8–10.5)
WBC # FLD AUTO: 9.62 K/UL — SIGNIFICANT CHANGE UP (ref 3.8–10.5)

## 2024-05-15 RX ORDER — SODIUM CHLORIDE 9 MG/ML
1000 INJECTION, SOLUTION INTRAVENOUS
Refills: 0 | Status: DISCONTINUED | OUTPATIENT
Start: 2024-05-16 | End: 2024-05-16

## 2024-05-15 RX ADMIN — Medication 650 MILLIGRAM(S): at 18:15

## 2024-05-15 RX ADMIN — Medication 650 MILLIGRAM(S): at 00:43

## 2024-05-15 RX ADMIN — Medication 650 MILLIGRAM(S): at 00:13

## 2024-05-15 RX ADMIN — GABAPENTIN 100 MILLIGRAM(S): 400 CAPSULE ORAL at 05:25

## 2024-05-15 RX ADMIN — ENOXAPARIN SODIUM 40 MILLIGRAM(S): 100 INJECTION SUBCUTANEOUS at 05:25

## 2024-05-15 RX ADMIN — Medication 650 MILLIGRAM(S): at 11:15

## 2024-05-15 RX ADMIN — Medication 650 MILLIGRAM(S): at 17:45

## 2024-05-15 RX ADMIN — Medication 650 MILLIGRAM(S): at 05:25

## 2024-05-15 RX ADMIN — POLYETHYLENE GLYCOL 3350 17 GRAM(S): 17 POWDER, FOR SOLUTION ORAL at 11:16

## 2024-05-15 RX ADMIN — GABAPENTIN 100 MILLIGRAM(S): 400 CAPSULE ORAL at 21:57

## 2024-05-15 RX ADMIN — Medication 650 MILLIGRAM(S): at 11:45

## 2024-05-15 RX ADMIN — Medication 650 MILLIGRAM(S): at 05:55

## 2024-05-15 RX ADMIN — GABAPENTIN 100 MILLIGRAM(S): 400 CAPSULE ORAL at 13:45

## 2024-05-15 NOTE — PROGRESS NOTE ADULT - ASSESSMENT
37y/o M with newly diagnosed buccal SCC pending OR next week.   - CM/SW  - diet as tolerated, NPO at midnight  - pain control  - OOBTC   - OR Thursday

## 2024-05-15 NOTE — PROGRESS NOTE ADULT - SUBJECTIVE AND OBJECTIVE BOX
Vital Signs Last 24 Hrs  T(C): 36.8 (15 May 2024 06:00), Max: 36.8 (14 May 2024 09:00)  T(F): 98.2 (15 May 2024 06:00), Max: 98.2 (14 May 2024 09:00)  HR: 86 (15 May 2024 06:00) (73 - 88)  BP: 108/72 (15 May 2024 06:00) (97/66 - 112/72)  BP(mean): --  RR: 16 (15 May 2024 06:00) (16 - 17)  SpO2: 97% (15 May 2024 06:00) (97% - 99%)    Parameters below as of 15 May 2024 06:00  Patient On (Oxygen Delivery Method): room air    ENT Progress Note    Interval:  Pt seen and examined at bedside, reports increased pain in cheek. has been tolerating diet.  plan for OR Thursday. No complaints overnight.    MEDICATIONS  (STANDING):  enoxaparin Injectable 40 milliGRAM(s) SubCutaneous every 24 hours    MEDICATIONS  (PRN):  acetaminophen     Tablet .. 650 milliGRAM(s) Oral every 6 hours PRN Mild Pain (1 - 3)  melatonin 3 milliGRAM(s) Oral at bedtime PRN Sleep  oxyCODONE    IR 5 milliGRAM(s) Oral every 6 hours PRN Severe Pain (7 - 10)  oxyCODONE    IR 2.5 milliGRAM(s) Oral every 6 hours PRN Moderate Pain (4 - 6)        Vital Signs Last 24 Hrs  T(C): 36.8 (15 May 2024 06:00), Max: 36.8 (14 May 2024 09:00)  T(F): 98.2 (15 May 2024 06:00), Max: 98.2 (14 May 2024 09:00)  HR: 86 (15 May 2024 06:00) (73 - 88)  BP: 108/72 (15 May 2024 06:00) (97/66 - 112/72)  BP(mean): --  RR: 16 (15 May 2024 06:00) (16 - 17)  SpO2: 97% (15 May 2024 06:00) (97% - 99%)    Parameters below as of 15 May 2024 06:00  Patient On (Oxygen Delivery Method): room air                Physical Exam:  General: well-developed, NAD  Mouth: L buccal mucosa ulcerative lesion, trismus  Neck: trachea midline  Respiratory: unlabored respirations

## 2024-05-16 ENCOUNTER — RESULT REVIEW (OUTPATIENT)
Age: 39
End: 2024-05-16

## 2024-05-16 LAB
A1C WITH ESTIMATED AVERAGE GLUCOSE RESULT: 5.2 % — SIGNIFICANT CHANGE UP (ref 4–5.6)
ANION GAP SERPL CALC-SCNC: 12 MMOL/L — SIGNIFICANT CHANGE UP (ref 7–14)
ANION GAP SERPL CALC-SCNC: 13 MMOL/L — SIGNIFICANT CHANGE UP (ref 7–14)
APTT BLD: 33.4 SEC — SIGNIFICANT CHANGE UP (ref 24.5–35.6)
APTT BLD: 37.9 SEC — HIGH (ref 24.5–35.6)
BLOOD GAS ARTERIAL - LYTES,HGB,ICA,LACT RESULT: SIGNIFICANT CHANGE UP
BUN SERPL-MCNC: 11 MG/DL — SIGNIFICANT CHANGE UP (ref 7–23)
BUN SERPL-MCNC: 9 MG/DL — SIGNIFICANT CHANGE UP (ref 7–23)
CALCIUM SERPL-MCNC: 8.5 MG/DL — SIGNIFICANT CHANGE UP (ref 8.4–10.5)
CALCIUM SERPL-MCNC: 9.8 MG/DL — SIGNIFICANT CHANGE UP (ref 8.4–10.5)
CHLORIDE SERPL-SCNC: 101 MMOL/L — SIGNIFICANT CHANGE UP (ref 98–107)
CHLORIDE SERPL-SCNC: 104 MMOL/L — SIGNIFICANT CHANGE UP (ref 98–107)
CO2 SERPL-SCNC: 23 MMOL/L — SIGNIFICANT CHANGE UP (ref 22–31)
CO2 SERPL-SCNC: 24 MMOL/L — SIGNIFICANT CHANGE UP (ref 22–31)
CREAT SERPL-MCNC: 0.65 MG/DL — SIGNIFICANT CHANGE UP (ref 0.5–1.3)
CREAT SERPL-MCNC: 0.78 MG/DL — SIGNIFICANT CHANGE UP (ref 0.5–1.3)
EGFR: 117 ML/MIN/1.73M2 — SIGNIFICANT CHANGE UP
EGFR: 124 ML/MIN/1.73M2 — SIGNIFICANT CHANGE UP
ESTIMATED AVERAGE GLUCOSE: 103 — SIGNIFICANT CHANGE UP
GAS PNL BLDA: SIGNIFICANT CHANGE UP
GLUCOSE SERPL-MCNC: 171 MG/DL — HIGH (ref 70–99)
GLUCOSE SERPL-MCNC: 90 MG/DL — SIGNIFICANT CHANGE UP (ref 70–99)
HCT VFR BLD CALC: 34.2 % — LOW (ref 39–50)
HCT VFR BLD CALC: 45.8 % — SIGNIFICANT CHANGE UP (ref 39–50)
HGB BLD-MCNC: 11.2 G/DL — LOW (ref 13–17)
HGB BLD-MCNC: 15.2 G/DL — SIGNIFICANT CHANGE UP (ref 13–17)
INR BLD: 1.2 RATIO — HIGH (ref 0.85–1.18)
INR BLD: 1.2 RATIO — HIGH (ref 0.85–1.18)
MAGNESIUM SERPL-MCNC: 1.7 MG/DL — SIGNIFICANT CHANGE UP (ref 1.6–2.6)
MAGNESIUM SERPL-MCNC: 2.3 MG/DL — SIGNIFICANT CHANGE UP (ref 1.6–2.6)
MCHC RBC-ENTMCNC: 26.7 PG — LOW (ref 27–34)
MCHC RBC-ENTMCNC: 27.1 PG — SIGNIFICANT CHANGE UP (ref 27–34)
MCHC RBC-ENTMCNC: 32.7 GM/DL — SIGNIFICANT CHANGE UP (ref 32–36)
MCHC RBC-ENTMCNC: 33.2 GM/DL — SIGNIFICANT CHANGE UP (ref 32–36)
MCV RBC AUTO: 81.6 FL — SIGNIFICANT CHANGE UP (ref 80–100)
MCV RBC AUTO: 81.6 FL — SIGNIFICANT CHANGE UP (ref 80–100)
NRBC # BLD: 0 /100 WBCS — SIGNIFICANT CHANGE UP (ref 0–0)
NRBC # BLD: 0 /100 WBCS — SIGNIFICANT CHANGE UP (ref 0–0)
NRBC # FLD: 0 K/UL — SIGNIFICANT CHANGE UP (ref 0–0)
NRBC # FLD: 0 K/UL — SIGNIFICANT CHANGE UP (ref 0–0)
PHOSPHATE SERPL-MCNC: 2.9 MG/DL — SIGNIFICANT CHANGE UP (ref 2.5–4.5)
PHOSPHATE SERPL-MCNC: 3.5 MG/DL — SIGNIFICANT CHANGE UP (ref 2.5–4.5)
PLATELET # BLD AUTO: 196 K/UL — SIGNIFICANT CHANGE UP (ref 150–400)
PLATELET # BLD AUTO: 251 K/UL — SIGNIFICANT CHANGE UP (ref 150–400)
POTASSIUM SERPL-MCNC: 3.8 MMOL/L — SIGNIFICANT CHANGE UP (ref 3.5–5.3)
POTASSIUM SERPL-MCNC: 4.7 MMOL/L — SIGNIFICANT CHANGE UP (ref 3.5–5.3)
POTASSIUM SERPL-SCNC: 3.8 MMOL/L — SIGNIFICANT CHANGE UP (ref 3.5–5.3)
POTASSIUM SERPL-SCNC: 4.7 MMOL/L — SIGNIFICANT CHANGE UP (ref 3.5–5.3)
PROTHROM AB SERPL-ACNC: 13.5 SEC — HIGH (ref 9.5–13)
PROTHROM AB SERPL-ACNC: 13.5 SEC — HIGH (ref 9.5–13)
RBC # BLD: 4.19 M/UL — LOW (ref 4.2–5.8)
RBC # BLD: 5.61 M/UL — SIGNIFICANT CHANGE UP (ref 4.2–5.8)
RBC # FLD: 11.8 % — SIGNIFICANT CHANGE UP (ref 10.3–14.5)
RBC # FLD: 11.9 % — SIGNIFICANT CHANGE UP (ref 10.3–14.5)
SODIUM SERPL-SCNC: 138 MMOL/L — SIGNIFICANT CHANGE UP (ref 135–145)
SODIUM SERPL-SCNC: 139 MMOL/L — SIGNIFICANT CHANGE UP (ref 135–145)
SURGICAL PATHOLOGY STUDY: SIGNIFICANT CHANGE UP
TSH SERPL-MCNC: 0.39 UIU/ML — SIGNIFICANT CHANGE UP (ref 0.27–4.2)
WBC # BLD: 10.02 K/UL — SIGNIFICANT CHANGE UP (ref 3.8–10.5)
WBC # BLD: 8.02 K/UL — SIGNIFICANT CHANGE UP (ref 3.8–10.5)
WBC # FLD AUTO: 10.02 K/UL — SIGNIFICANT CHANGE UP (ref 3.8–10.5)
WBC # FLD AUTO: 8.02 K/UL — SIGNIFICANT CHANGE UP (ref 3.8–10.5)

## 2024-05-16 PROCEDURE — 40816 EXCISION OF MOUTH LESION: CPT | Mod: GC

## 2024-05-16 PROCEDURE — 88305 TISSUE EXAM BY PATHOLOGIST: CPT | Mod: 26

## 2024-05-16 PROCEDURE — 15757 FREE SKIN FLAP MICROVASC: CPT

## 2024-05-16 PROCEDURE — 31600 PLANNED TRACHEOSTOMY: CPT | Mod: GC

## 2024-05-16 PROCEDURE — 88309 TISSUE EXAM BY PATHOLOGIST: CPT | Mod: 26

## 2024-05-16 PROCEDURE — 42890 LIMITED PHARYNGECTOMY: CPT | Mod: GC

## 2024-05-16 PROCEDURE — 88311 DECALCIFY TISSUE: CPT | Mod: 26

## 2024-05-16 PROCEDURE — 21045 EXTENSIVE JAW SURGERY: CPT | Mod: GC

## 2024-05-16 PROCEDURE — 14302 TIS TRNFR ADDL 30 SQ CM: CPT

## 2024-05-16 PROCEDURE — 71045 X-RAY EXAM CHEST 1 VIEW: CPT | Mod: 26

## 2024-05-16 PROCEDURE — 88331 PATH CONSLTJ SURG 1 BLK 1SPC: CPT | Mod: 26

## 2024-05-16 PROCEDURE — 31225 REMOVAL OF UPPER JAW: CPT | Mod: GC

## 2024-05-16 PROCEDURE — 14301 TIS TRNFR ANY 30.1-60 SQ CM: CPT

## 2024-05-16 PROCEDURE — 15273 SKIN SUB GRFT T/ARM/LG CHILD: CPT

## 2024-05-16 PROCEDURE — 15100 SPLT AGRFT T/A/L 1ST 100SQCM: CPT

## 2024-05-16 PROCEDURE — 38724 REMOVAL OF LYMPH NODES NECK: CPT | Mod: GC,LT

## 2024-05-16 PROCEDURE — 99291 CRITICAL CARE FIRST HOUR: CPT | Mod: 25

## 2024-05-16 PROCEDURE — 40840 RECONSTRUCTION OF MOUTH: CPT

## 2024-05-16 DEVICE — COUPLER VESSEL MICROVASC ANAST 2MM GRN: Type: IMPLANTABLE DEVICE | Status: FUNCTIONAL

## 2024-05-16 DEVICE — CARTRIDGE MICROCLIP 30: Type: IMPLANTABLE DEVICE | Status: FUNCTIONAL

## 2024-05-16 DEVICE — INTEGRA WOUND MATRIX MESHED BILAYER 4X5": Type: IMPLANTABLE DEVICE | Status: FUNCTIONAL

## 2024-05-16 DEVICE — AGENT HEMOSTATIC HEMOBLAST 1.65G 10CM: Type: IMPLANTABLE DEVICE | Status: FUNCTIONAL

## 2024-05-16 DEVICE — LIGATING CLIPS WECK HORIZON SMALL-WIDE (RED) 24: Type: IMPLANTABLE DEVICE | Status: FUNCTIONAL

## 2024-05-16 DEVICE — SURGIFOAM PAD 8CM X 12.5CM X 2MM (100C): Type: IMPLANTABLE DEVICE | Status: FUNCTIONAL

## 2024-05-16 DEVICE — LIGATING CLIPS WECK HORIZON MEDIUM (BLUE) 24: Type: IMPLANTABLE DEVICE | Status: FUNCTIONAL

## 2024-05-16 DEVICE — DOPPLER PROBE DISPOSABLE: Type: IMPLANTABLE DEVICE | Status: FUNCTIONAL

## 2024-05-16 DEVICE — FLOSEAL WITH RECOTHROM THROMBIN 10ML: Type: IMPLANTABLE DEVICE | Status: FUNCTIONAL

## 2024-05-16 DEVICE — COUPLER VESSEL MICROVASC ANAST 4MM ORNG: Type: IMPLANTABLE DEVICE | Status: FUNCTIONAL

## 2024-05-16 DEVICE — BONE WAX 2.5GM: Type: IMPLANTABLE DEVICE | Status: FUNCTIONAL

## 2024-05-16 RX ORDER — GABAPENTIN 400 MG/1
600 CAPSULE ORAL DAILY
Refills: 0 | Status: DISCONTINUED | OUTPATIENT
Start: 2024-05-16 | End: 2024-05-22

## 2024-05-16 RX ORDER — LABETALOL HCL 100 MG
5 TABLET ORAL EVERY 6 HOURS
Refills: 0 | Status: DISCONTINUED | OUTPATIENT
Start: 2024-05-16 | End: 2024-05-17

## 2024-05-16 RX ORDER — PANTOPRAZOLE SODIUM 20 MG/1
40 TABLET, DELAYED RELEASE ORAL DAILY
Refills: 0 | Status: DISCONTINUED | OUTPATIENT
Start: 2024-05-16 | End: 2024-05-16

## 2024-05-16 RX ORDER — ACETAMINOPHEN 500 MG
1000 TABLET ORAL EVERY 6 HOURS
Refills: 0 | Status: DISCONTINUED | OUTPATIENT
Start: 2024-05-16 | End: 2024-05-17

## 2024-05-16 RX ORDER — CHLORHEXIDINE GLUCONATE 213 G/1000ML
15 SOLUTION TOPICAL
Refills: 0 | Status: DISCONTINUED | OUTPATIENT
Start: 2024-05-16 | End: 2024-05-22

## 2024-05-16 RX ORDER — GABAPENTIN 400 MG/1
600 CAPSULE ORAL DAILY
Refills: 0 | Status: DISCONTINUED | OUTPATIENT
Start: 2024-05-16 | End: 2024-05-16

## 2024-05-16 RX ORDER — LABETALOL HCL 100 MG
5 TABLET ORAL EVERY 6 HOURS
Refills: 0 | Status: DISCONTINUED | OUTPATIENT
Start: 2024-05-16 | End: 2024-05-16

## 2024-05-16 RX ORDER — PANTOPRAZOLE SODIUM 20 MG/1
40 TABLET, DELAYED RELEASE ORAL DAILY
Refills: 0 | Status: DISCONTINUED | OUTPATIENT
Start: 2024-05-16 | End: 2024-05-22

## 2024-05-16 RX ORDER — DEXAMETHASONE 0.5 MG/5ML
8 ELIXIR ORAL EVERY 8 HOURS
Refills: 0 | Status: DISCONTINUED | OUTPATIENT
Start: 2024-05-16 | End: 2024-05-17

## 2024-05-16 RX ORDER — AMPICILLIN SODIUM AND SULBACTAM SODIUM 250; 125 MG/ML; MG/ML
3 INJECTION, POWDER, FOR SUSPENSION INTRAMUSCULAR; INTRAVENOUS EVERY 6 HOURS
Refills: 0 | Status: DISCONTINUED | OUTPATIENT
Start: 2024-05-16 | End: 2024-05-16

## 2024-05-16 RX ORDER — SENNA PLUS 8.6 MG/1
10 TABLET ORAL DAILY
Refills: 0 | Status: DISCONTINUED | OUTPATIENT
Start: 2024-05-16 | End: 2024-05-22

## 2024-05-16 RX ORDER — HYDROMORPHONE HYDROCHLORIDE 2 MG/ML
0.5 INJECTION INTRAMUSCULAR; INTRAVENOUS; SUBCUTANEOUS EVERY 4 HOURS
Refills: 0 | Status: DISCONTINUED | OUTPATIENT
Start: 2024-05-16 | End: 2024-05-17

## 2024-05-16 RX ORDER — ENOXAPARIN SODIUM 100 MG/ML
40 INJECTION SUBCUTANEOUS EVERY 24 HOURS
Refills: 0 | Status: DISCONTINUED | OUTPATIENT
Start: 2024-05-16 | End: 2024-05-16

## 2024-05-16 RX ORDER — AMPICILLIN SODIUM AND SULBACTAM SODIUM 250; 125 MG/ML; MG/ML
3 INJECTION, POWDER, FOR SUSPENSION INTRAMUSCULAR; INTRAVENOUS ONCE
Refills: 0 | Status: COMPLETED | OUTPATIENT
Start: 2024-05-16 | End: 2024-05-16

## 2024-05-16 RX ORDER — HYDROMORPHONE HYDROCHLORIDE 2 MG/ML
1 INJECTION INTRAMUSCULAR; INTRAVENOUS; SUBCUTANEOUS EVERY 4 HOURS
Refills: 0 | Status: DISCONTINUED | OUTPATIENT
Start: 2024-05-16 | End: 2024-05-17

## 2024-05-16 RX ORDER — CHLORHEXIDINE GLUCONATE 213 G/1000ML
1 SOLUTION TOPICAL DAILY
Refills: 0 | Status: DISCONTINUED | OUTPATIENT
Start: 2024-05-16 | End: 2024-05-19

## 2024-05-16 RX ORDER — POLYETHYLENE GLYCOL 3350 17 G/17G
17 POWDER, FOR SOLUTION ORAL DAILY
Refills: 0 | Status: DISCONTINUED | OUTPATIENT
Start: 2024-05-16 | End: 2024-05-22

## 2024-05-16 RX ORDER — OXYCODONE HYDROCHLORIDE 5 MG/1
5 TABLET ORAL EVERY 4 HOURS
Refills: 0 | Status: DISCONTINUED | OUTPATIENT
Start: 2024-05-16 | End: 2024-05-16

## 2024-05-16 RX ORDER — OXYCODONE HYDROCHLORIDE 5 MG/1
10 TABLET ORAL EVERY 4 HOURS
Refills: 0 | Status: DISCONTINUED | OUTPATIENT
Start: 2024-05-16 | End: 2024-05-16

## 2024-05-16 RX ORDER — AMPICILLIN SODIUM AND SULBACTAM SODIUM 250; 125 MG/ML; MG/ML
INJECTION, POWDER, FOR SUSPENSION INTRAMUSCULAR; INTRAVENOUS
Refills: 0 | Status: DISCONTINUED | OUTPATIENT
Start: 2024-05-16 | End: 2024-05-17

## 2024-05-16 RX ORDER — ONDANSETRON 8 MG/1
4 TABLET, FILM COATED ORAL EVERY 8 HOURS
Refills: 0 | Status: DISCONTINUED | OUTPATIENT
Start: 2024-05-16 | End: 2024-05-24

## 2024-05-16 RX ORDER — ENOXAPARIN SODIUM 100 MG/ML
40 INJECTION SUBCUTANEOUS EVERY 24 HOURS
Refills: 0 | Status: DISCONTINUED | OUTPATIENT
Start: 2024-05-16 | End: 2024-05-22

## 2024-05-16 RX ORDER — AMPICILLIN SODIUM AND SULBACTAM SODIUM 250; 125 MG/ML; MG/ML
3 INJECTION, POWDER, FOR SUSPENSION INTRAMUSCULAR; INTRAVENOUS EVERY 6 HOURS
Refills: 0 | Status: DISCONTINUED | OUTPATIENT
Start: 2024-05-17 | End: 2024-05-17

## 2024-05-16 RX ADMIN — Medication 650 MILLIGRAM(S): at 01:02

## 2024-05-16 RX ADMIN — SODIUM CHLORIDE 75 MILLILITER(S): 9 INJECTION, SOLUTION INTRAVENOUS at 00:17

## 2024-05-16 RX ADMIN — Medication 650 MILLIGRAM(S): at 20:15

## 2024-05-16 RX ADMIN — Medication 650 MILLIGRAM(S): at 00:32

## 2024-05-16 RX ADMIN — HYDROMORPHONE HYDROCHLORIDE 1 MILLIGRAM(S): 2 INJECTION INTRAMUSCULAR; INTRAVENOUS; SUBCUTANEOUS at 22:23

## 2024-05-16 RX ADMIN — HYDROMORPHONE HYDROCHLORIDE 1 MILLIGRAM(S): 2 INJECTION INTRAMUSCULAR; INTRAVENOUS; SUBCUTANEOUS at 22:53

## 2024-05-16 RX ADMIN — AMPICILLIN SODIUM AND SULBACTAM SODIUM 200 GRAM(S): 250; 125 INJECTION, POWDER, FOR SUSPENSION INTRAMUSCULAR; INTRAVENOUS at 20:00

## 2024-05-16 RX ADMIN — GABAPENTIN 100 MILLIGRAM(S): 400 CAPSULE ORAL at 05:06

## 2024-05-16 RX ADMIN — Medication 650 MILLIGRAM(S): at 19:45

## 2024-05-16 RX ADMIN — Medication 101.6 MILLIGRAM(S): at 20:00

## 2024-05-16 RX ADMIN — Medication 650 MILLIGRAM(S): at 05:06

## 2024-05-16 RX ADMIN — Medication 650 MILLIGRAM(S): at 05:36

## 2024-05-16 NOTE — CONSULT NOTE ADULT - ATTENDING COMMENTS
Patient s/p buccal mass resection and plastics free flap, requiring sicu for q1 hr flap monitoring  N mentating, pain controlled  resp on trach collar, tolerating  cv hemodynamically stable, q1 hr flap checks  gi npo, ivf, ngt, start tube feeds tomorrow  gu/renal adequate uop  heme vte ppx  id unasyn  endo no changes    The patient is a critical care patient with life threatening hemodynamic and metabolic instability in SICU.  I have personally interviewed when possible and examined the patient, reviewed data and laboratory tests/x-rays and all pertinent electronic images.  I was physically present for the key portions of the evaluation and management (E/M) service provided.   The SICU team has a constant risk benefit analyzes discussion with the primary team, all consultants, House Staff and PA's on all decisions.  These diagnoses are unrelated to the surgical procedure noted above.  I meet with family if needed to get further history, discuss the case and make care decisions for this patient who might not be able to participate.  Time involved in performance of separately billable procedures was not counted toward my critical care time. There is no overlap.  I spent 55-75 minutes ( 0800Hrs-0915Hrs in AM/ 1600Hrs-1715Hrs in PM, or other time indicated) of critical care time for the diagnoses, assessment, plan and interventions.  This time excludes time spent on separate procedures and teaching.

## 2024-05-16 NOTE — DIETITIAN INITIAL EVALUATION ADULT - ADD RECOMMEND
1. Advance diet consistency when medically feasible per MD and team discretion.  2. Please consider to provide Ensure Max Protein (11 oz serving provides 150 kcal, 30gm protein) once daily  3. Nursing to document PO in RN flow sheets and monitor weekly weights.   4. Continue to monitor skin integrity, bowel regimen, and nutrition pertinent labs.

## 2024-05-16 NOTE — DIETITIAN INITIAL EVALUATION ADULT - REASON FOR ADMISSION
Localized swelling, mass, and lump of head.  Per H&P chart review, Patient is a 38y Male with no significant PMH who presented to an OSH today for complaints of L sided jaw and face pain. Pt endorses this pain for the past 2 months but reports he was in [place] so did not seek care. Pt reports he has had difficulty eating as chewing is painful for him. Pt has difficulty opening his mouth. Pt does not follow with a PCP or dentist. He has no difficulty swallowing. Pt reports a 6-7kg weight loss over the past few months. Pt endorses using chewing tobacco and stopped 25 days ago. Pt used to drink alcohol but reports he does not any longer. CT Head and Neck showing mass involving buccal mucosa extending into the area of the parotid.

## 2024-05-16 NOTE — DIETITIAN INITIAL EVALUATION ADULT - ORAL INTAKE PTA/DIET HISTORY
RD attempted to visit Patient twice today, Patient s/p OR resection and free flap today, did not return to the unit since morning.  Information collateral from chart review.  Patient is Frank speaking, A&O x 3-4 at baseline.      No prior [facility] HIE history with inconsistent weight and height history.  Unable to confirm with Patient at this time.  5/10- height 172.6cm, 67kg, 5/17-height 167.6cm, 70kg

## 2024-05-16 NOTE — CONSULT NOTE ADULT - SUBJECTIVE AND OBJECTIVE BOX
=====================================================                                                             SICU Consultation Note                                                             =====================================================  Patient is a 38y old  Male who presents with a chief complaint of Localized swelling, mass, and lump of head.  Per H&P chart review, Patient is a 38y Male with no significant PMH who presented to an OSH today for complaints of L sided jaw and face pain. Pt endorses this pain for the past 2 months but reports he was in [place] so did not seek care. Pt reports he has had difficulty eating as chewing is painful for him. Pt has difficulty opening his mouth. Pt does not follow with a PCP or dentist. He has no difficulty swallowing. Pt reports a 6-7kg weight loss over the past few months. Pt endorses using chewing tobacco and stopped 25 days ago. Pt used to drink alcohol but reports he does not any longer. CT Head and Neck showing mass involving buccal mucosa extending into the area of the parotid. (16 May 2024 16:24)    HPI: 38 year old male with recent history of chewing tobacco (stopped 1 month ago) and alcohol use (no longer drinker)  presented to [facility] with 1 month of left sided jaw and face pain. He reports having difficulty chewing due to the pain and lost 7 kilos over the past few months. CT Head and Neck revealed mass involving buccal mucosa extending into the area of the parotid, found to be invasive SCC. Patient transferred to [facility] ENT service for surgery. 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    Surgery Information  ***  Case Duration:      EBL:       IV Fluids:       Blood Products:         Complications:        HISTORY  38y Male  Allergies: Allergy Status Unknown  PAST MEDICAL & SURGICAL HISTORY:  No pertinent past medical history    FAMILY HISTORY:  SOCIAL HISTORY:    ADVANCE DIRECTIVES: Presumed Full Code    REVIEW OF SYSTEMS:    General: Non-Contributory  Skin/Breast: Non-Contributory  Ophthalmologic: Non-Contributory  ENMT: Non-Contributory  Respiratory and Thorax: Non-Contributory  Cardiovascular: Non-Contributory  Gastrointestinal: Non-Contributory  Genitourinary: Non-Contributory  Musculoskeletal: Non-Contributory  Neurological: Non-Contributory  Psychiatric: Non-Contributory  Hematology/Lymphatics: Non-Contributory  Endocrine: Non-Contributory  Allergic/Immunologic: Non-Contributory  HOME MEDICATIONS: NONE  CURRENT MEDICATIONS:   --------------------------------------------------------------------------------------  Neurologic Medications  acetaminophen     Tablet .. 650 milliGRAM(s) Oral every 6 hours  gabapentin 100 milliGRAM(s) Oral three times a day  melatonin 3 milliGRAM(s) Oral at bedtime PRN Sleep  oxyCODONE    IR 5 milliGRAM(s) Oral every 6 hours PRN Moderate Pain (4 - 6)  oxyCODONE    IR 10 milliGRAM(s) Oral every 6 hours PRN Severe Pain (7 - 10)  Gastrointestinal Medications  lactated ringers. 1000 milliLiter(s) IV Continuous <Continuous>  polyethylene glycol 3350 17 Gram(s) Oral daily  Hematologic/Oncologic Medications  enoxaparin Injectable 40 milliGRAM(s) SubCutaneous every 24 hours  --------------------------------------------------------------------------------------  VITAL SIGNS, INS/OUTS (last 24 hours):  Pending    EXAM  NEUROLOGY  Exam: Normal, NAD, alert, oriented x 3, no focal deficits.   HEENT  Exam: Normocephalic, atraumatic.  EOMI   RESPIRATORY  Exam: Lungs clear to auscultation, Normal expansion/effort.    CARDIOVASCULAR  Exam: S1, S2.  Regular rate and rhythm  GI/NUTRITION  Exam: Abdomen soft, Non-tender, Non-distended.  Wound:    Current Diet:  NPO  VASCULAR  Exam: Extremities warm, pink, well-perfused.   MUSCULOSKELETAL  Exam: All extremities moving spontaneously without limitations.    SKIN:  Exam: Good skin turgor, no skin breakdown.    METABOLIC/FLUIDS/ELECTROLYTES  lactated ringers. 1000 milliLiter(s) IV Continuous <Continuous>  HEMATOLOGIC  [x] DVT Prophylaxis: enoxaparin Injectable 40 milliGRAM(s) SubCutaneous every 24 hours  Transfusions:	[] PRBC	[] Platelets		[] FFP	[] Cryoprecipitate  INFECTIOUS DISEASE  Antimicrobials/Immunologic Medications:    Day #  	of    ***  Tubes/Lines/Drains  ***  [x] Peripheral IV  [] Central Venous Line     	[] R	[] L	[] IJ	[] Fem	[] SC	Date Placed:   [] Arterial Line		[] R	[] L	[] Fem	[] Rad	[] Ax	Date Placed:   [] PICC:         	[] Midline		[] Mediport  [] Urinary Catheter		Date Placed:     LABS                                          15.2                  Neurophils% (auto):   x      (05-16 @ 03:26):    8.02 )-----------(251          Lymphocytes% (auto):  x                                             45.8                   Eosinphils% (auto):   x      Manual%: Neutrophils x    ; Lymphocytes x    ; Eosinophils x    ; Bands%: x    ; Blasts x      05-16  138  |  101  |  11  ----------------------------<  90  4.7   |  24  |  0.78  Ca    9.8      16 May 2024 03:26  Phos  3.5     05-16  Mg     2.30     05-16  ( 05-16 @ 03:26 )   PT: 13.5 sec;   INR: 1.20 ratio  aPTT: 37.9 sec  ABG - ( 16 May 2024 09:52 )  pH: 7.51  /  pCO2: 29    /  pO2: 225   / HCO3: 23    / Base Excess: 1.1   /  SaO2: 99.4  / Lactate: x        OTHER LABS  IMAGING RESULTS  CT:                                                                 =====================================================                                                             SICU Consultation Note                                                             =====================================================  HPI: 38 year old male with recent history of chewing tobacco (stopped 1 month ago) and alcohol use (no longer drinker)  presented to [facility] with 1 month of left sided jaw and face pain. He reports having difficulty chewing due to the pain and lost 7 kilos over the past few months. CT Head and Neck revealed mass involving buccal mucosa extending into the area of the parotid, found to be invasive SCC. Patient transferred to [facility] ENT service for surgery. Patient is s/p fresh trach, left buccal resection of SCC on the maxilla and mandible, left neck dissection, right neck dissection for vessels, left forearm free flap and left thigh StSG. Patient has two NATTY drains and wound vac on the left arm.     Surgery Information   EBL: 400 IV Fluids: 500 Urine output: 1000             FAMILY HISTORY:  SOCIAL HISTORY:    ADVANCE DIRECTIVES: Presumed Full Code    REVIEW OF SYSTEMS:    General: Non-Contributory  Skin/Breast: Non-Contributory  Ophthalmologic: Non-Contributory  ENMT: Non-Contributory  Respiratory and Thorax: Non-Contributory  Cardiovascular: Non-Contributory  Gastrointestinal: Non-Contributory  Genitourinary: Non-Contributory  Musculoskeletal: Non-Contributory  Neurological: Non-Contributory  Psychiatric: Non-Contributory  Hematology/Lymphatics: Non-Contributory  Endocrine: Non-Contributory  Allergic/Immunologic: Non-Contributory  HOME MEDICATIONS: NONE  CURRENT MEDICATIONS:   --------------------------------------------------------------------------------------  Neurologic Medications  acetaminophen     Tablet .. 650 milliGRAM(s) Oral every 6 hours  gabapentin 100 milliGRAM(s) Oral three times a day  melatonin 3 milliGRAM(s) Oral at bedtime PRN Sleep  oxyCODONE    IR 5 milliGRAM(s) Oral every 6 hours PRN Moderate Pain (4 - 6)  oxyCODONE    IR 10 milliGRAM(s) Oral every 6 hours PRN Severe Pain (7 - 10)  Gastrointestinal Medications  lactated ringers. 1000 milliLiter(s) IV Continuous <Continuous>  polyethylene glycol 3350 17 Gram(s) Oral daily  Hematologic/Oncologic Medications  enoxaparin Injectable 40 milliGRAM(s) SubCutaneous every 24 hours  --------------------------------------------------------------------------------------  VITAL SIGNS, INS/OUTS (last 24 hours):  Pending    EXAM  NEUROLOGY  Exam: Normal, NAD, alert, oriented x 3, no focal deficits.     HEENT  Exam: Normocephalic, atraumatic.  EOMI. NATTY x2 w/ SS output,     RESPIRATORY  Exam: Trach collar, lungs clear to auscultation, Normal expansion/effort.      CARDIOVASCULAR  Exam: S1, S2.  Regular rate and rhythm    GI/NUTRITION  Exam: Abdomen soft, Non-tender, Non-distended.    Current Diet:  NPO    VASCULAR  Exam: Extremities warm, pink, well-perfused.     MUSCULOSKELETAL  Exam: Wound vac on left arm and StSG on left thigh. All extremities moving spontaneously without limitations.      SKIN:  Exam: Good skin turgor, no skin breakdown.      METABOLIC/FLUIDS/ELECTROLYTES  lactated ringers. 1000 milliLiter(s) IV Continuous <Continuous>  HEMATOLOGIC  [x] DVT Prophylaxis: enoxaparin Injectable 40 milliGRAM(s) SubCutaneous every 24 hours  Transfusions:	[] PRBC	[] Platelets		[] FFP	[] Cryoprecipitate  INFECTIOUS DISEASE  Antimicrobials/Immunologic Medications:    Day #  	of    ***  Tubes/Lines/Drains  ***  [x] Peripheral IV  [] Central Venous Line     	[] R	[] L	[] IJ	[] Fem	[] SC	Date Placed:   [x] Arterial Line		[] R	[] L	[] Fem	[] Rad	[] Ax	Date Placed:   [] PICC:         	[] Midline		[] Mediport  [x] Urinary Catheter		Date Placed:     LABS                                          15.2                  Neurophils% (auto):   x      (05-16 @ 03:26):    8.02 )-----------(251          Lymphocytes% (auto):  x                                             45.8                   Eosinphils% (auto):   x      Manual%: Neutrophils x    ; Lymphocytes x    ; Eosinophils x    ; Bands%: x    ; Blasts x      05-16  138  |  101  |  11  ----------------------------<  90  4.7   |  24  |  0.78  Ca    9.8      16 May 2024 03:26  Phos  3.5     05-16  Mg     2.30     05-16  ( 05-16 @ 03:26 )   PT: 13.5 sec;   INR: 1.20 ratio  aPTT: 37.9 sec  ABG - ( 16 May 2024 09:52 )  pH: 7.51  /  pCO2: 29    /  pO2: 225   / HCO3: 23    / Base Excess: 1.1   /  SaO2: 99.4  / Lactate: x        OTHER LABS  IMAGING RESULTS  CT:

## 2024-05-16 NOTE — PROGRESS NOTE ADULT - ASSESSMENT
37y/o M with newly diagnosed buccal SCC, plan for OR this AM.    - OR this AM  - will require SICU level care thereafter with ERAS protocol

## 2024-05-16 NOTE — CONSULT NOTE ADULT - ASSESSMENT
38 year old male with recent history of chewing tobacco (stopped 1 month ago) and alcohol use (no longer drinker)  presented to [facility] with 1 month of left sided jaw and face pain. He reports having difficulty chewing due to the pain and lost 7 kilos over the past few months. CT Head and Neck revealed mass involving buccal mucosa extending into the area of the parotid, found to be invasive SCC. Patient transferred to [facility] ENT service for surgery. 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    PLAN:  ***  Neurologic:   - Pain control with tylenol and dilaudid prn  Respiratory:   -   Cardiovascular:   - SBP under 140  Gastrointestinal/Nutrition:   - NPO  - Protonix  - Senna miralax  - Zofran PRN  Renal/Genitourinary:   - Monitor i's and o's with soto  Hematologic:   - Lovenox for DVT ppx  Infectious Disease:   - Unasyn x 3 doses  Tubes/Lines/Drains:   -  Endocrine:   - Monitor glucose on bmp  Disposition: SICU 38 year old male with recent history of chewing tobacco (stopped 1 month ago) and alcohol use (no longer drinker)  presented to [facility] with 1 month of left sided jaw and face pain. He reports having difficulty chewing due to the pain and lost 7 kilos over the past few months. CT Head and Neck revealed mass involving buccal mucosa extending into the area of the parotid, found to be invasive SCC. Patient transferred to [facility] ENT service for surgery. Patient is s/p fresh trach, left buccal resection of SCC on the maxilla and mandible, left neck dissection, right neck dissection for vessels, left forearm free flap and left thigh StSG. Patient has two NATTY drains and wound vac on the left arm.       PLAN:    Neurologic   - Pain control with tylenol, gabapentin, and oxycodone prn    Respiratory:   - trach collar  - SpO2 > 92%     Cardiovascular:   - SBP under 140    Gastrointestinal/Nutrition:   - NPO  - Protonix 40 QD  - Bowel regimen: Senna and miralax  - Zofran PRN    Renal/Genitourinary:   - Monitor i's and o's with soto    Hematologic:   - Lovenox for DVT ppx    Infectious Disease:   - Unasyn x 3 doses    Endocrine:   - Decadron 8 q8  - Monitor glucose on bmp    Tubes/Lines/Drains:   - A- line  - PIV  - Soto catheter        Disposition: SICU

## 2024-05-16 NOTE — BRIEF OPERATIVE NOTE - OPERATION/FINDINGS
recon of left buccal defect with L RFFF.  Integra and StSG from L thigh to L arm.  application of vac to arm .  Flap anastomosed to R superior thyroid and Branch of IJx2 (2.0,4.0).  Jpx2 in neck, soto, vac, cook

## 2024-05-16 NOTE — DIETITIAN INITIAL EVALUATION ADULT - PERTINENT MEDS FT
MEDICATIONS  (STANDING):  acetaminophen     Tablet .. 650 milliGRAM(s) Oral every 6 hours  enoxaparin Injectable 40 milliGRAM(s) SubCutaneous every 24 hours  gabapentin 100 milliGRAM(s) Oral three times a day  lactated ringers. 1000 milliLiter(s) (75 mL/Hr) IV Continuous <Continuous>  polyethylene glycol 3350 17 Gram(s) Oral daily    MEDICATIONS  (PRN):  melatonin 3 milliGRAM(s) Oral at bedtime PRN Sleep  oxyCODONE    IR 5 milliGRAM(s) Oral every 6 hours PRN Moderate Pain (4 - 6)  oxyCODONE    IR 10 milliGRAM(s) Oral every 6 hours PRN Severe Pain (7 - 10)

## 2024-05-16 NOTE — PROGRESS NOTE ADULT - SUBJECTIVE AND OBJECTIVE BOX
ENT Progress Note    Interval:  Pt seen and examined at bedside, has been NPO since midnight. Pain controlled at this time. Has several questions regarding surgery, addressed.     MEDICATIONS  (STANDING):  enoxaparin Injectable 40 milliGRAM(s) SubCutaneous every 24 hours    MEDICATIONS  (PRN):  acetaminophen     Tablet .. 650 milliGRAM(s) Oral every 6 hours PRN Mild Pain (1 - 3)  melatonin 3 milliGRAM(s) Oral at bedtime PRN Sleep  oxyCODONE    IR 5 milliGRAM(s) Oral every 6 hours PRN Severe Pain (7 - 10)  oxyCODONE    IR 2.5 milliGRAM(s) Oral every 6 hours PRN Moderate Pain (4 - 6)        Vital Signs Last 24 Hrs  T(C): 36.8 (15 May 2024 06:00), Max: 36.8 (14 May 2024 09:00)  T(F): 98.2 (15 May 2024 06:00), Max: 98.2 (14 May 2024 09:00)  HR: 86 (15 May 2024 06:00) (73 - 88)  BP: 108/72 (15 May 2024 06:00) (97/66 - 112/72)  BP(mean): --  RR: 16 (15 May 2024 06:00) (16 - 17)  SpO2: 97% (15 May 2024 06:00) (97% - 99%)    Parameters below as of 15 May 2024 06:00  Patient On (Oxygen Delivery Method): room air                Physical Exam:  General: well-developed, NAD  Mouth: L buccal mucosa ulcerative lesion, trismus  Neck: trachea midline  Respiratory: unlabored respirations

## 2024-05-16 NOTE — DIETITIAN INITIAL EVALUATION ADULT - NS FNS DIET ORDER
March 1, 2024        Jaswinder Price  [address] [place] [address] [address] [place] [address]    To Whom It May Concern:    This is to certify Jaswinder Price was evaluated on 03/01/24 and is unable to return to work. He may return to work on 03/04/24 as long as he meets the CDC criteria listed below.     CDC guidelines for return to work are as follows:    Jaswinder Price should self-isolate for 5 days.  After isolation is completed, he may return to work, wearing a mask while around others.  Perform a home COVID-19 test on days 6 and 8. If both are negative, masking can be discontinued.    **The loss of taste and smell may persist for weeks or months after recovery and do not need to delay the end of isolation.     Per CDC recommendations, employers should not require a COVID-19 test result or a healthcare provider’s note for employees who are sick to validate their illness, qualify for sick leave, or to return to work.  The Coronavirus is a rapidly evolving situation and recommendations are changing regularly to prevent spread of the disease and further loss of life.    Thank you for your understanding during these unusual times.     Electronically signed by:     YOUNG Malone                 [employer] [facility]   [address] [place], [place] [id number]  
Diet, NPO after Midnight:      NPO Start Date: 15-May-2024,   NPO Start Time: 23:59  Except Medications (05-16-24 @ 01:24)

## 2024-05-16 NOTE — DIETITIAN INITIAL EVALUATION ADULT - OTHER INFO
Patient is receiving a puree diet order in house since admission.  Pt was noted mostly adequate-good oral intake when documented per RN intake flowsheet.  No reported active GI distress such as nausea, vomit, diarrhea, constipation.  Pt is on bowel regimen, no BM reported per RN flowsheets.  Skin intact without edema, no pressure injury per RN flowsheets.  Labs all within acceptable ranges.  Of note, Patient reported 6-7kg weight loss for unknown period of time with sub-optimal intake PTA due to difficulty to chew 2/2 buccal mass which put him at risk for malnutrition.  RDN to remain available for further nutrition intervention as indicated.

## 2024-05-16 NOTE — DIETITIAN INITIAL EVALUATION ADULT - PERTINENT LABORATORY DATA
05-16    138  |  101  |  11  ----------------------------<  90  4.7   |  24  |  0.78    Ca    9.8      16 May 2024 03:26  Phos  3.5     05-16  Mg     2.30     05-16

## 2024-05-17 LAB
ANION GAP SERPL CALC-SCNC: 12 MMOL/L — SIGNIFICANT CHANGE UP (ref 7–14)
BUN SERPL-MCNC: 8 MG/DL — SIGNIFICANT CHANGE UP (ref 7–23)
CALCIUM SERPL-MCNC: 8.4 MG/DL — SIGNIFICANT CHANGE UP (ref 8.4–10.5)
CHLORIDE SERPL-SCNC: 104 MMOL/L — SIGNIFICANT CHANGE UP (ref 98–107)
CO2 SERPL-SCNC: 23 MMOL/L — SIGNIFICANT CHANGE UP (ref 22–31)
CREAT SERPL-MCNC: 0.7 MG/DL — SIGNIFICANT CHANGE UP (ref 0.5–1.3)
EGFR: 121 ML/MIN/1.73M2 — SIGNIFICANT CHANGE UP
GLUCOSE SERPL-MCNC: 135 MG/DL — HIGH (ref 70–99)
HCT VFR BLD CALC: 32.3 % — LOW (ref 39–50)
HGB BLD-MCNC: 10.9 G/DL — LOW (ref 13–17)
MAGNESIUM SERPL-MCNC: 1.8 MG/DL — SIGNIFICANT CHANGE UP (ref 1.6–2.6)
MCHC RBC-ENTMCNC: 27.3 PG — SIGNIFICANT CHANGE UP (ref 27–34)
MCHC RBC-ENTMCNC: 33.7 GM/DL — SIGNIFICANT CHANGE UP (ref 32–36)
MCV RBC AUTO: 81 FL — SIGNIFICANT CHANGE UP (ref 80–100)
MRSA PCR RESULT.: SIGNIFICANT CHANGE UP
NRBC # BLD: 0 /100 WBCS — SIGNIFICANT CHANGE UP (ref 0–0)
NRBC # FLD: 0 K/UL — SIGNIFICANT CHANGE UP (ref 0–0)
PHOSPHATE SERPL-MCNC: 2.7 MG/DL — SIGNIFICANT CHANGE UP (ref 2.5–4.5)
PLATELET # BLD AUTO: 223 K/UL — SIGNIFICANT CHANGE UP (ref 150–400)
POTASSIUM SERPL-MCNC: 3.8 MMOL/L — SIGNIFICANT CHANGE UP (ref 3.5–5.3)
POTASSIUM SERPL-SCNC: 3.8 MMOL/L — SIGNIFICANT CHANGE UP (ref 3.5–5.3)
RBC # BLD: 3.99 M/UL — LOW (ref 4.2–5.8)
RBC # FLD: 11.9 % — SIGNIFICANT CHANGE UP (ref 10.3–14.5)
S AUREUS DNA NOSE QL NAA+PROBE: SIGNIFICANT CHANGE UP
SODIUM SERPL-SCNC: 139 MMOL/L — SIGNIFICANT CHANGE UP (ref 135–145)
WBC # BLD: 10.57 K/UL — HIGH (ref 3.8–10.5)
WBC # FLD AUTO: 10.57 K/UL — HIGH (ref 3.8–10.5)

## 2024-05-17 PROCEDURE — 71045 X-RAY EXAM CHEST 1 VIEW: CPT | Mod: 26,76

## 2024-05-17 PROCEDURE — 99233 SBSQ HOSP IP/OBS HIGH 50: CPT | Mod: GC

## 2024-05-17 RX ORDER — OXYCODONE HYDROCHLORIDE 5 MG/1
10 TABLET ORAL EVERY 6 HOURS
Refills: 0 | Status: DISCONTINUED | OUTPATIENT
Start: 2024-05-17 | End: 2024-05-22

## 2024-05-17 RX ORDER — MAGNESIUM SULFATE 500 MG/ML
1 VIAL (ML) INJECTION ONCE
Refills: 0 | Status: COMPLETED | OUTPATIENT
Start: 2024-05-17 | End: 2024-05-17

## 2024-05-17 RX ORDER — SODIUM CHLORIDE 9 MG/ML
1000 INJECTION, SOLUTION INTRAVENOUS
Refills: 0 | Status: DISCONTINUED | OUTPATIENT
Start: 2024-05-17 | End: 2024-05-17

## 2024-05-17 RX ORDER — ACETAMINOPHEN 500 MG
1000 TABLET ORAL EVERY 6 HOURS
Refills: 0 | Status: DISCONTINUED | OUTPATIENT
Start: 2024-05-17 | End: 2024-05-22

## 2024-05-17 RX ORDER — LABETALOL HCL 100 MG
5 TABLET ORAL EVERY 6 HOURS
Refills: 0 | Status: DISCONTINUED | OUTPATIENT
Start: 2024-05-17 | End: 2024-05-17

## 2024-05-17 RX ORDER — OXYCODONE HYDROCHLORIDE 5 MG/1
5 TABLET ORAL EVERY 6 HOURS
Refills: 0 | Status: DISCONTINUED | OUTPATIENT
Start: 2024-05-17 | End: 2024-05-22

## 2024-05-17 RX ADMIN — SODIUM CHLORIDE 100 MILLILITER(S): 9 INJECTION, SOLUTION INTRAVENOUS at 06:00

## 2024-05-17 RX ADMIN — Medication 1000 MILLIGRAM(S): at 14:30

## 2024-05-17 RX ADMIN — AMPICILLIN SODIUM AND SULBACTAM SODIUM 200 GRAM(S): 250; 125 INJECTION, POWDER, FOR SUSPENSION INTRAMUSCULAR; INTRAVENOUS at 05:05

## 2024-05-17 RX ADMIN — POLYETHYLENE GLYCOL 3350 17 GRAM(S): 17 POWDER, FOR SOLUTION ORAL at 21:00

## 2024-05-17 RX ADMIN — Medication 400 MILLIGRAM(S): at 02:37

## 2024-05-17 RX ADMIN — SENNA PLUS 10 MILLILITER(S): 8.6 TABLET ORAL at 21:00

## 2024-05-17 RX ADMIN — HYDROMORPHONE HYDROCHLORIDE 1 MILLIGRAM(S): 2 INJECTION INTRAMUSCULAR; INTRAVENOUS; SUBCUTANEOUS at 15:00

## 2024-05-17 RX ADMIN — CHLORHEXIDINE GLUCONATE 15 MILLILITER(S): 213 SOLUTION TOPICAL at 05:05

## 2024-05-17 RX ADMIN — Medication 100 GRAM(S): at 09:20

## 2024-05-17 RX ADMIN — CHLORHEXIDINE GLUCONATE 1 APPLICATION(S): 213 SOLUTION TOPICAL at 11:13

## 2024-05-17 RX ADMIN — Medication 400 MILLIGRAM(S): at 14:02

## 2024-05-17 RX ADMIN — SODIUM CHLORIDE 100 MILLILITER(S): 9 INJECTION, SOLUTION INTRAVENOUS at 09:21

## 2024-05-17 RX ADMIN — HYDROMORPHONE HYDROCHLORIDE 1 MILLIGRAM(S): 2 INJECTION INTRAMUSCULAR; INTRAVENOUS; SUBCUTANEOUS at 03:06

## 2024-05-17 RX ADMIN — Medication 1000 MILLIGRAM(S): at 23:26

## 2024-05-17 RX ADMIN — Medication 5 MILLIGRAM(S): at 01:51

## 2024-05-17 RX ADMIN — OXYCODONE HYDROCHLORIDE 10 MILLIGRAM(S): 5 TABLET ORAL at 21:00

## 2024-05-17 RX ADMIN — ENOXAPARIN SODIUM 40 MILLIGRAM(S): 100 INJECTION SUBCUTANEOUS at 02:36

## 2024-05-17 RX ADMIN — HYDROMORPHONE HYDROCHLORIDE 1 MILLIGRAM(S): 2 INJECTION INTRAMUSCULAR; INTRAVENOUS; SUBCUTANEOUS at 02:36

## 2024-05-17 RX ADMIN — Medication 1000 MILLIGRAM(S): at 08:52

## 2024-05-17 RX ADMIN — OXYCODONE HYDROCHLORIDE 10 MILLIGRAM(S): 5 TABLET ORAL at 22:00

## 2024-05-17 RX ADMIN — Medication 101.6 MILLIGRAM(S): at 05:05

## 2024-05-17 RX ADMIN — PANTOPRAZOLE SODIUM 40 MILLIGRAM(S): 20 TABLET, DELAYED RELEASE ORAL at 11:13

## 2024-05-17 RX ADMIN — CHLORHEXIDINE GLUCONATE 15 MILLILITER(S): 213 SOLUTION TOPICAL at 17:07

## 2024-05-17 RX ADMIN — HYDROMORPHONE HYDROCHLORIDE 1 MILLIGRAM(S): 2 INJECTION INTRAMUSCULAR; INTRAVENOUS; SUBCUTANEOUS at 14:42

## 2024-05-17 RX ADMIN — GABAPENTIN 600 MILLIGRAM(S): 400 CAPSULE ORAL at 11:13

## 2024-05-17 RX ADMIN — Medication 400 MILLIGRAM(S): at 08:20

## 2024-05-17 RX ADMIN — AMPICILLIN SODIUM AND SULBACTAM SODIUM 200 GRAM(S): 250; 125 INJECTION, POWDER, FOR SUSPENSION INTRAMUSCULAR; INTRAVENOUS at 11:13

## 2024-05-17 NOTE — PROGRESS NOTE ADULT - SUBJECTIVE AND OBJECTIVE BOX
INTERVAL EVENTS:  - NGT replaced by team    SUBJECTIVE/ROS:  [ ] A ten-point review of systems was otherwise negative except as noted.  [ ] Due to altered mental status/intubation, subjective information were not able to be obtained from the patient. History was obtained, to the extent possible, from review of the chart and collateral sources of information.    NEURO  Exam: awake, alert, oriented  Meds: acetaminophen   IVPB .. 1000 milliGRAM(s) IV Intermittent every 6 hours  gabapentin Solution 600 milliGRAM(s) Oral daily  HYDROmorphone  Injectable 1 milliGRAM(s) IV Push every 4 hours PRN Severe Pain (7 - 10)  HYDROmorphone  Injectable 0.5 milliGRAM(s) IV Push every 4 hours PRN Moderate Pain (4 - 6)  ondansetron Injectable 4 milliGRAM(s) IV Push every 8 hours PRN Nausea and/or Vomiting    RESPIRATORY  RR: 16 (05-17-24 @ 00:00) (14 - 18)  SpO2: 100% (05-17-24 @ 00:00) (95% - 100%)  Exam: unlabored, no increased work of breathing  Mechanical Ventilation:   ABG - ( 16 May 2024 16:55 )  pH: 7.39  /  pCO2: 39    /  pO2: 174   / HCO3: 24    / Base Excess: -1.2  /  SaO2: 99.4      CARDIOVASCULAR  HR: 71 (05-17-24 @ 00:00) (71 - 80)  BP: 128/80 (05-16-24 @ 22:00) (107/66 - 128/80)  BP(mean): 96 (05-16-24 @ 22:00) (96 - 96)  ABP: 142/73 (05-17-24 @ 00:00) (135/74 - 153/85)  ABP(mean): 96 (05-17-24 @ 00:00) (96 - 111)  Exam: regular rate and rhythm  Cardiac Rhythm: sinus  Perfusion     [x]Adequate   [ ]Inadequate  Mentation   [x]Normal       [ ]Reduced  Extremities  [x]Warm         [ ]Cool  Volume Status [ ]Hypervolemic [x]Euvolemic [ ]Hypovolemic  Meds: labetalol Injectable 5 milliGRAM(s) IV Push every 6 hours PRN SBP > 160      GI/NUTRITION  Exam: soft, nondistended, NGT sutured, dried blood on lips,drain small amount SS  Diet:  Meds: pantoprazole  Injectable 40 milliGRAM(s) IV Push daily  polyethylene glycol 3350 17 Gram(s) Oral daily  senna Syrup 10 milliLiter(s) Oral daily      GENITOURINARY  I&O's Detail    05-15 @ 07:01  -  05-16 @ 07:00  --------------------------------------------------------  IN:    Lactated Ringers: 450 mL    Oral Fluid: 960 mL  Total IN: 1410 mL    OUT:  Total OUT: 0 mL    Total NET: 1410 mL      05-16 @ 07:01  -  05-17 @ 00:29  --------------------------------------------------------  IN:  Total IN: 0 mL    OUT:    Indwelling Catheter - Urethral (mL): 475 mL  Total OUT: 475 mL    Total NET: -475 mL        Weight (kg): 70 (05-16 @ 05:12)  05-16    139  |  104  |  9   ----------------------------<  171<H>  3.8   |  23  |  0.65    Ca    8.5      16 May 2024 22:00  Phos  2.9     05-16  Mg     1.70     05-16      [ ] Nieves catheter, indication: N/A  Meds:     HEMATOLOGIC  Meds: enoxaparin Injectable 40 milliGRAM(s) SubCutaneous every 24 hours    [x] VTE Prophylaxis                        11.2   10.02 )-----------( 196      ( 16 May 2024 22:00 )             34.2     PT/INR - ( 16 May 2024 22:00 )   PT: 13.5 sec;   INR: 1.20 ratio         PTT - ( 16 May 2024 22:00 )  PTT:33.4 sec  Transfusion     [ ] PRBC   [ ] Platelets   [ ] FFP   [ ] Cryoprecipitate    INFECTIOUS DISEASES  WBC Count: 10.02 K/uL (05-16 @ 22:00)  WBC Count: 8.02 K/uL (05-16 @ 03:26)    RECENT CULTURES:    Meds: ampicillin/sulbactam  IVPB      ampicillin/sulbactam  IVPB 3 Gram(s) IV Intermittent every 6 hours      ENDOCRINE  CAPILLARY BLOOD GLUCOSE        Meds: dexAMETHasone  IVPB 8 milliGRAM(s) IV Intermittent every 8 hours      ACCESS DEVICES:  [ ] Peripheral IV  [ ] Central Venous Line	  [ ] Arterial Line		   [ ] PICC					  [ ] Mediport  [ ] Urinary Catheter, Date Placed:     OTHER MEDICATIONS:  chlorhexidine 0.12% Liquid 15 milliLiter(s) Swish and Spit two times a day  chlorhexidine 2% Cloths 1 Application(s) Topical daily      CODE STATUS:      IMAGING:  < from: Xray Chest 1 View- PORTABLE-Urgent (Xray Chest 1 View- PORTABLE-Urgent .) (05.16.24 @ 22:21) >    INTERPRETATION:  Impression:  Prelim:  Enteric tube coiled in the upper esophagus. Recommend removing and   replacing.    Dr. Osullivan discussed these findings with HANH Stacy on 5/16/2024 10:28   PM .    < end of copied text >

## 2024-05-17 NOTE — PHYSICAL THERAPY INITIAL EVALUATION ADULT - PERTINENT HX OF CURRENT PROBLEM, REHAB EVAL
38 year old male presented to [facility] with 1 month of left sided jaw and face pain. He reports having difficulty chewing due to the pain and lost 7 kilos over the past few months. CT Head and Neck revealed mass involving buccal mucosa extending into the area of the parotid, found to be invasive SCC. Patient transferred to [facility] ENT service for surgery. Now post left buccal resection of SCC on the maxilla and mandible, left neck dissection, right neck dissection for vessels, left forearm free flap and left thigh StSG. POD#1.

## 2024-05-17 NOTE — PHYSICAL THERAPY INITIAL EVALUATION ADULT - GAIT DISTANCE, PT EVAL
pt reports lightheadedness limiting distance, RN Yosef present, resolved when sitting in chair./bed to chair/5 feet

## 2024-05-17 NOTE — PROGRESS NOTE ADULT - ASSESSMENT
38yMale with newly diagnosed L buccal invasive SCC s/p L buccal composite rxn, SND, trach, L RFFF, L STSG 5/16, recovering well in SICU.    - ERAS protocol  - q1h flap checks with cook and exam  - cw vac to LUE  - pain control as needed  - keep >    Jalen Avendano MD  Plastic and Reconstructive Surgery, PGY3  Available on [employer] Teams  [facility]: [id number], [place]: [phone] [facility]: Green Team [id number]

## 2024-05-17 NOTE — PHYSICAL THERAPY INITIAL EVALUATION ADULT - ADDITIONAL COMMENTS
Pt reports he lives with family in a house with steps to enter and steps inside. Pt reports being fully independent in ADLs and ambulation without device.    Following evaluation, pt was left sitting in chair in no distress, all lines in tact, call bell in reach.

## 2024-05-17 NOTE — CHART NOTE - NSCHARTNOTEFT_GEN_A_CORE
Patient being seen for nutrition consult and critical care nutrition follow up. Spoke with RN, pt and obtained subjective information from extensive chart review.     Current Diet : Diet, NPO with Tube Feed:   Tube Feeding Modality: Nasogastric  Pivot 1.5 Nazario (PIVOT1.5RTH)  Total Volume for 24 Hours (mL): 1032  Continuous  Starting Tube Feed Rate {mL per Hour}: 25  Increase Tube Feed Rate by (mL): 25     Every 4 hours  Until Goal Tube Feed Rate (mL per Hour): 43  Tube Feed Duration (in Hours): 24  Tube Feed Start Time: 10:00 (05-17-24 @ 08:50)    TF Provides:  1547 kcals  97 gms protein  783 mL free H2O  1032 mL total volume    Current Weight: 70 kg (5/16)  Height (cm): 167.6  Admit Weight (kg): 70   BMI (kg/m2): 24.9     Nutrition Interval Events: Pt now s/p left buccal resection of SCC on the maxilla and mandible, left neck dissection and right neck dissection for vessels. He is being initiated on TF with consult placed for recommendations. Suggest goal rate of 50 mL/hr x 24 hrs given that pt has SCC and has higher nutrition demands for surgical healing. This goal will offer patient 1800 kcals, 113 gms protein, 910 mL free H2O in 1200 mL total volume/day and will give pt 25 kcals/kg and 1.6 gms protein/kg of his admit wt. No BMs noted - pt ordered bowel regimen. Educated pt on components of EN and reassured pt that his nutrition needs would be met through this form of nutrition, including vitamins and minerals. Pt nodded with understanding of information provided. RDN services to remain available as needed.     __________________ Pertinent Medications__________________   MEDICATIONS  (STANDING):  acetaminophen   IVPB .. 1000 milliGRAM(s) IV Intermittent every 6 hours  chlorhexidine 0.12% Liquid 15 milliLiter(s) Swish and Spit two times a day  chlorhexidine 2% Cloths 1 Application(s) Topical daily  enoxaparin Injectable 40 milliGRAM(s) SubCutaneous every 24 hours  gabapentin Solution 600 milliGRAM(s) Oral daily  pantoprazole  Injectable 40 milliGRAM(s) IV Push daily  polyethylene glycol 3350 17 Gram(s) Oral daily  senna Syrup 10 milliLiter(s) Oral daily    __________________ Pertinent Labs__________________   05-17 Na139 mmol/L Glu 135 mg/dL<H> K+ 3.8 mmol/L Cr  0.70 mg/dL BUN 8 mg/dL 05-17 Phos 2.7 mg/dL      Skin: No skin injuries identified as per nursing flow sheet records     Estimated Needs (based on admit wt - 70 kg):     1750 - 2100 kcals/day (25-30 kcals/kg)  98 - 112 gms protein/day (1.4-1.6 gms protein/kg)      Previous Nutrition Diagnosis:     Inadequate Oral Intake    Nutrition Diagnosis is: [x] ongoing        New Nutrition Diagnosis:     Increased Nutrient Needs        Related to: physiological causes   As evidenced by: pt with SCC of the maxilla and mandible s/p left buccal resection, left neck dissection and right neck dissection for vessels.      Goal(s):  1. Patient to meet > 75% estimated energy needs    Recommendations:   1. Pivot 1.5 @ goal rate of 50 mL/hr x 24 hrs.    Monitoring and Evaluation:   1. Monitor weights, labs, BMs, skin integrity, EN tolerance and edema.  2. RD services to remain available.     Education:    [x] Given today          Type of education provided: On enteral nutrition and need for it    Ally Rivera, MS, RDN, CDN, CNSC on [employer] TEAMS PREFERRED or Pager #[phone] Patient being seen for nutrition consult and critical care nutrition follow up. Spoke with RN, pt via Frank  ID: [id number] and obtained subjective information from extensive chart review.     Current Diet : Diet, NPO with Tube Feed:   Tube Feeding Modality: Nasogastric  Pivot 1.5 Nazario (PIVOT1.5RTH)  Total Volume for 24 Hours (mL): 1032  Continuous  Starting Tube Feed Rate {mL per Hour}: 25  Increase Tube Feed Rate by (mL): 25     Every 4 hours  Until Goal Tube Feed Rate (mL per Hour): 43  Tube Feed Duration (in Hours): 24  Tube Feed Start Time: 10:00 (05-17-24 @ 08:50)    TF Provides:  1547 kcals  97 gms protein  783 mL free H2O  1032 mL total volume    Current Weight: 70 kg (5/16)  Height (cm): 167.6  Admit Weight (kg): 70   BMI (kg/m2): 24.9     Nutrition Interval Events: Pt now s/p left buccal resection of SCC on the maxilla and mandible, left neck dissection and right neck dissection for vessels. He is being initiated on TF with consult placed for recommendations. Suggest goal rate of 50 mL/hr x 24 hrs given that pt has SCC and has higher nutrition demands for surgical healing. This goal will offer patient 1800 kcals, 113 gms protein, 910 mL free H2O in 1200 mL total volume/day and will give pt 25 kcals/kg and 1.6 gms protein/kg of his admit wt. No BMs noted - pt ordered bowel regimen. Educated pt on components of EN and reassured pt that his nutrition needs would be met through this form of nutrition, including vitamins and minerals. Pt nodded with understanding of information provided. RDN services to remain available as needed.     __________________ Pertinent Medications__________________   MEDICATIONS  (STANDING):  acetaminophen   IVPB .. 1000 milliGRAM(s) IV Intermittent every 6 hours  chlorhexidine 0.12% Liquid 15 milliLiter(s) Swish and Spit two times a day  chlorhexidine 2% Cloths 1 Application(s) Topical daily  enoxaparin Injectable 40 milliGRAM(s) SubCutaneous every 24 hours  gabapentin Solution 600 milliGRAM(s) Oral daily  pantoprazole  Injectable 40 milliGRAM(s) IV Push daily  polyethylene glycol 3350 17 Gram(s) Oral daily  senna Syrup 10 milliLiter(s) Oral daily    __________________ Pertinent Labs__________________   05-17 Na139 mmol/L Glu 135 mg/dL<H> K+ 3.8 mmol/L Cr  0.70 mg/dL BUN 8 mg/dL 05-17 Phos 2.7 mg/dL      Skin: No skin injuries identified as per nursing flow sheet records     Estimated Needs (based on admit wt - 70 kg):     1750 - 2100 kcals/day (25-30 kcals/kg)  98 - 112 gms protein/day (1.4-1.6 gms protein/kg)      Previous Nutrition Diagnosis:     Inadequate Oral Intake    Nutrition Diagnosis is: [x] ongoing        New Nutrition Diagnosis:     Increased Nutrient Needs        Related to: physiological causes   As evidenced by: pt with SCC of the maxilla and mandible s/p left buccal resection, left neck dissection and right neck dissection for vessels.      Goal(s):  1. Patient to meet > 75% estimated energy needs    Recommendations:   1. Pivot 1.5 @ goal rate of 50 mL/hr x 24 hrs.    Monitoring and Evaluation:   1. Monitor weights, labs, BMs, skin integrity, EN tolerance and edema.  2. RD services to remain available.     Education:    [x] Given today          Type of education provided: On enteral nutrition and need for it    Ally Rivera, MS, RDN, CDN, CNSC on [employer] TEAMS PREFERRED or Pager #[phone]

## 2024-05-17 NOTE — PHYSICAL THERAPY INITIAL EVALUATION ADULT - ACTIVE RANGE OF MOTION EXAMINATION, REHAB EVAL
left wrist ROM not assessed./bilateral upper extremity Active ROM was WFL (within functional limits)/bilateral  lower extremity Active ROM was WFL (within functional limits)

## 2024-05-17 NOTE — PROGRESS NOTE ADULT - ASSESSMENT
38M w PMHx chewing tobacco (stopped 1 month ago) and alcohol use (no longer drinker)  presented to [facility] with 1 month of left sided jaw and face pain. He reports having difficulty chewing due to the pain and lost 7 kilos over the past few months. CT Head and Neck revealed mass involving buccal mucosa extending into the area of the parotid, found to be invasive SCC. Patient transferred to [facility] ENT service for surgery.     Now s/p fresh trach, left buccal resection of SCC on the maxilla and mandible, left neck dissection, right neck dissection for vessels, left forearm free flap and left thigh StSG. Patient has two NATTY drains and wound vac on the left arm.     PLAN:    Neurologic   - Pain control with tylenol, gabapentin, and oxycodone prn    Respiratory:   - trach collar  - SpO2 > 92%     Cardiovascular:   - SBP under 140    Gastrointestinal/Nutrition:   - NPO  - Protonix 40 QD  - Bowel regimen: Senna and miralax  - Zofran PRN  - Follow-up NGT placement    Renal/Genitourinary:   - Monitor i's and o's with soto    Hematologic:   - Lovenox for DVT ppx    Infectious Disease:   - Unasyn x 3 doses    Endocrine:   - Decadron 8 q8  - Monitor glucose on bmp    Tubes/Lines/Drains:   - A- line  - PIV  - Soto catheter   38M w PMHx chewing tobacco (stopped 1 month ago) and alcohol use (no longer drinker)  presented to [facility] with 1 month of left sided jaw and face pain. He reports having difficulty chewing due to the pain and lost 7 kilos over the past few months. CT Head and Neck revealed mass involving buccal mucosa extending into the area of the parotid, found to be invasive SCC. Patient transferred to [facility] ENT service for surgery.     Now s/p fresh trach, left buccal resection of SCC on the maxilla and mandible, left neck dissection, right neck dissection for vessels, left forearm free flap and left thigh StSG. Patient has two NATTY drains and wound vac on the left arm.     Neurologic   - Pain control with tylenol, gabapentin, and oxycodone prn    Respiratory:   - trach collar  - SpO2 > 92%     Cardiovascular:   - SBP under 140    Gastrointestinal/Nutrition:   - NPO with TFs  - Protonix 40 QD  - Bowel regimen: Senna and miralax  - Zofran PRN    Renal/Genitourinary:   - D/C Nieves today, f/u TOV     Hematologic:   - Lovenox for DVT ppx    Infectious Disease:   - Unasyn x 3 doses    Endocrine:   - Monitor glucose on bmp    Tubes/Lines/Drains:   - A- line  - PIV  - Nieves catheter

## 2024-05-17 NOTE — PHYSICAL THERAPY INITIAL EVALUATION ADULT - DID THE PATIENT HAVE SURGERY?
Left buccal resection of SCC on the maxilla and mandible, left neck dissection, right neck dissection for vessels, left forearm free flap and left thigh StSG./yes

## 2024-05-17 NOTE — PROGRESS NOTE ADULT - SUBJECTIVE AND OBJECTIVE BOX
ENT Progress Note    Interval:  Pt seen and examined s/p L buccal composite rxn, SND, trach, L RFFF, L STSG 5/16. CXR revealed NGT coiled in two locations, re-placed and re-sutured bedside, confirmed in position under direct visualization. Reports pain is well controlled.     MEDICATIONS  (STANDING):  acetaminophen   IVPB .. 1000 milliGRAM(s) IV Intermittent every 6 hours  ampicillin/sulbactam  IVPB 3 Gram(s) IV Intermittent every 6 hours  ampicillin/sulbactam  IVPB      chlorhexidine 0.12% Liquid 15 milliLiter(s) Swish and Spit two times a day  chlorhexidine 2% Cloths 1 Application(s) Topical daily  dexAMETHasone  IVPB 8 milliGRAM(s) IV Intermittent every 8 hours  enoxaparin Injectable 40 milliGRAM(s) SubCutaneous every 24 hours  gabapentin Solution 600 milliGRAM(s) Oral daily  pantoprazole  Injectable 40 milliGRAM(s) IV Push daily  polyethylene glycol 3350 17 Gram(s) Oral daily  senna Syrup 10 milliLiter(s) Oral daily    MEDICATIONS  (PRN):  HYDROmorphone  Injectable 0.5 milliGRAM(s) IV Push every 4 hours PRN Moderate Pain (4 - 6)  HYDROmorphone  Injectable 1 milliGRAM(s) IV Push every 4 hours PRN Severe Pain (7 - 10)  labetalol Injectable 5 milliGRAM(s) IV Push every 6 hours PRN SBP > 160  ondansetron Injectable 4 milliGRAM(s) IV Push every 8 hours PRN Nausea and/or Vomiting        Vital Signs Last 24 Hrs  T(C): 36.2 (16 May 2024 22:00), Max: 37.1 (16 May 2024 05:12)  T(F): 97.1 (16 May 2024 22:00), Max: 98.8 (16 May 2024 05:12)  HR: 71 (17 May 2024 00:00) (71 - 80)  BP: 128/80 (16 May 2024 22:00) (107/66 - 128/80)  BP(mean): 96 (16 May 2024 22:00) (96 - 96)  RR: 16 (17 May 2024 00:00) (14 - 18)  SpO2: 100% (17 May 2024 00:00) (95% - 100%)    Parameters below as of 16 May 2024 22:00  Patient On (Oxygen Delivery Method): tracheostomy collar  O2 Flow (L/min): 6  O2 Concentration (%): 28    Physical Exam:  Alert, NAD  Nonlabored Respirations RA, no stridor or stertor   OC/OP: left buccal flap with good color and turgor  Neck: soft/flat, incision c/d/i, 6CN trach in place   Neuro: CN II-XII grossly intact   Cook signal strong  Neck JPs w SS drainage  L arm in ace wrap    05-15-24 @ 07:01  -  05-16-24 @ 07:00  --------------------------------------------------------  IN: 1410 mL / OUT: 0 mL / NET: 1410 mL    05-16-24 @ 07:01  -  05-17-24 @ 01:11  --------------------------------------------------------  IN: 0 mL / OUT: 475 mL / NET: -475 mL                              11.2   10.02 )-----------( 196      ( 16 May 2024 22:00 )             34.2    05-16    139  |  104  |  9   ----------------------------<  171<H>  3.8   |  23  |  0.65    Ca    8.5      16 May 2024 22:00  Phos  2.9     05-16  Mg     1.70     05-16     PT/INR - ( 16 May 2024 22:00 )   PT: 13.5 sec;   INR: 1.20 ratio         PTT - ( 16 May 2024 22:00 )  PTT:33.4 sec      A/P: 38yMale with newly diagnosed L buccal invasive SCC s/p L buccal composite rxn, SND, trach, L RFFF, L STSG 5/16, recovering well in SICU.    - ERAS protocol  - q1h flap checks  - follow up CXR for NGT placement  - pain control as needed

## 2024-05-17 NOTE — PROGRESS NOTE ADULT - SUBJECTIVE AND OBJECTIVE BOX
POST ANESTHESIA EVALUATION    38y Male POSTOP DAY 1      MENTAL STATUS: Patient participation [ ] Awake     [ x ] Arousable     [  ] Sedated    AIRWAY PATENCY: [x  ] Satisfactory  [  ] Other:     Vital Signs Last 24 Hrs  T(C): 37.6 (17 May 2024 08:00), Max: 37.6 (17 May 2024 08:00)  T(F): 99.7 (17 May 2024 08:00), Max: 99.7 (17 May 2024 08:00)  HR: 89 (17 May 2024 11:00) (71 - 98)  BP: 117/61 (17 May 2024 11:00) (107/71 - 128/80)  BP(mean): 78 (17 May 2024 11:00) (78 - 96)  RR: 26 (17 May 2024 11:00) (12 - 26)  SpO2: 99% (17 May 2024 11:00) (95% - 100%)    Parameters below as of 17 May 2024 08:00  Patient On (Oxygen Delivery Method): tracheostomy collar  O2 Flow (L/min): 4  O2 Concentration (%): 28  I&O's Summary    16 May 2024 07:01  -  17 May 2024 07:00  --------------------------------------------------------  IN: 500 mL / OUT: 1300 mL / NET: -800 mL    17 May 2024 07:01  -  17 May 2024 12:11  --------------------------------------------------------  IN: 0 mL / OUT: 250 mL / NET: -250 mL          NAUSEA/ VOMITTING:  [ x] NONE  [  ] CONTROLLED [  ] OTHER     PAIN: [  x] CONTROLLED WITH CURRENT REGIMEN  [  ] OTHER    [ x ] NO APPARENT ANESTHESIA COMPLICATIONS      Comments:

## 2024-05-17 NOTE — PROGRESS NOTE ADULT - SUBJECTIVE AND OBJECTIVE BOX
Plastic Surgery Progress Note (pg [facility]: [id number], [facility]: [phone])    SUBJECTIVE  The patient was seen and examined. did well in [facility]     OBJECTIVE  ___________________________________________________  VITAL SIGNS / I&O's   Vital Signs Last 24 Hrs  T(C): 36.5 (17 May 2024 04:00), Max: 36.5 (17 May 2024 04:00)  T(F): 97.7 (17 May 2024 04:00), Max: 97.7 (17 May 2024 04:00)  HR: 87 (17 May 2024 07:00) (71 - 98)  BP: 128/80 (16 May 2024 22:00) (128/80 - 128/80)  BP(mean): 96 (16 May 2024 22:00) (96 - 96)  RR: 15 (17 May 2024 07:00) (12 - 18)  SpO2: 99% (17 May 2024 07:00) (95% - 100%)    Parameters below as of 17 May 2024 04:00  Patient On (Oxygen Delivery Method): room air          16 May 2024 07:01  -  17 May 2024 07:00  --------------------------------------------------------  IN:    IV PiggyBack: 300 mL    Lactated Ringers: 200 mL  Total IN: 500 mL    OUT:    Bulb (mL): 30 mL    Bulb (mL): 20 mL    Indwelling Catheter - Urethral (mL): 1250 mL    VAC (Vacuum Assisted Closure) System (mL): 0 mL  Total OUT: 1300 mL    Total NET: -800 mL        ___________________________________________________  PHYSICAL EXAM    -- CONSTITUTIONAL: NAD, lying in bed  -- NEURO: Awake, alert  -- HEENT:  intraoral flap appropriate color, no congestion  neck soft, no collections  incision cdi  cook doppler +   JPx2 ss  LUE: vac in place holding suction    ___________________________________________________  LABS                        10.9   10.57 )-----------( 223      ( 17 May 2024 06:30 )             32.3     16 May 2024 22:00    139    |  104    |  9      ----------------------------<  171    3.8     |  23     |  0.65     Ca    8.5        16 May 2024 22:00  Phos  2.9       16 May 2024 22:00  Mg     1.70      16 May 2024 22:00      PT/INR - ( 16 May 2024 22:00 )   PT: 13.5 sec;   INR: 1.20 ratio         PTT - ( 16 May 2024 22:00 )  PTT:33.4 sec  CAPILLARY BLOOD GLUCOSE            Urinalysis Basic - ( 16 May 2024 22:00 )    Color: x / Appearance: x / SG: x / pH: x  Gluc: 171 mg/dL / Ketone: x  / Bili: x / Urobili: x   Blood: x / Protein: x / Nitrite: x   Leuk Esterase: x / RBC: x / WBC x   Sq Epi: x / Non Sq Epi: x / Bacteria: x      ___________________________________________________  MICRO  Recent Cultures:    ___________________________________________________  MEDICATIONS  (STANDING):  acetaminophen   IVPB .. 1000 milliGRAM(s) IV Intermittent every 6 hours  ampicillin/sulbactam  IVPB      ampicillin/sulbactam  IVPB 3 Gram(s) IV Intermittent every 6 hours  chlorhexidine 0.12% Liquid 15 milliLiter(s) Swish and Spit two times a day  chlorhexidine 2% Cloths 1 Application(s) Topical daily  enoxaparin Injectable 40 milliGRAM(s) SubCutaneous every 24 hours  gabapentin Solution 600 milliGRAM(s) Oral daily  lactated ringers. 1000 milliLiter(s) (100 mL/Hr) IV Continuous <Continuous>  pantoprazole  Injectable 40 milliGRAM(s) IV Push daily  polyethylene glycol 3350 17 Gram(s) Oral daily  senna Syrup 10 milliLiter(s) Oral daily    MEDICATIONS  (PRN):  HYDROmorphone  Injectable 1 milliGRAM(s) IV Push every 4 hours PRN Severe Pain (7 - 10)  HYDROmorphone  Injectable 0.5 milliGRAM(s) IV Push every 4 hours PRN Moderate Pain (4 - 6)  labetalol Injectable 5 milliGRAM(s) IV Push every 6 hours PRN SBP > 150  ondansetron Injectable 4 milliGRAM(s) IV Push every 8 hours PRN Nausea and/or Vomiting

## 2024-05-17 NOTE — PROGRESS NOTE ADULT - SUBJECTIVE AND OBJECTIVE BOX
ENT Progress Note    Interval:  Pt seen and examined s/p L buccal composite rxn, SND, trach, L RFFF, L STSG 5/16. CXR revealed NGT coiled in two locations, re-placed and re-sutured bedside overnight, confirmed in position under direct visualization. Reports pain is well controlled.     MEDICATIONS  (STANDING):  acetaminophen   IVPB .. 1000 milliGRAM(s) IV Intermittent every 6 hours  ampicillin/sulbactam  IVPB 3 Gram(s) IV Intermittent every 6 hours  ampicillin/sulbactam  IVPB      chlorhexidine 0.12% Liquid 15 milliLiter(s) Swish and Spit two times a day  chlorhexidine 2% Cloths 1 Application(s) Topical daily  dexAMETHasone  IVPB 8 milliGRAM(s) IV Intermittent every 8 hours  enoxaparin Injectable 40 milliGRAM(s) SubCutaneous every 24 hours  gabapentin Solution 600 milliGRAM(s) Oral daily  pantoprazole  Injectable 40 milliGRAM(s) IV Push daily  polyethylene glycol 3350 17 Gram(s) Oral daily  senna Syrup 10 milliLiter(s) Oral daily    MEDICATIONS  (PRN):  HYDROmorphone  Injectable 0.5 milliGRAM(s) IV Push every 4 hours PRN Moderate Pain (4 - 6)  HYDROmorphone  Injectable 1 milliGRAM(s) IV Push every 4 hours PRN Severe Pain (7 - 10)  labetalol Injectable 5 milliGRAM(s) IV Push every 6 hours PRN SBP > 160  ondansetron Injectable 4 milliGRAM(s) IV Push every 8 hours PRN Nausea and/or Vomiting      Vital Signs Last 24 Hrs  T(C): 36.5 (17 May 2024 04:00), Max: 37 (16 May 2024 07:09)  T(F): 97.7 (17 May 2024 04:00), Max: 98.6 (16 May 2024 07:09)  HR: 90 (17 May 2024 06:00) (71 - 98)  BP: 128/80 (16 May 2024 22:00) (113/62 - 128/80)  BP(mean): 96 (16 May 2024 22:00) (96 - 96)  RR: 18 (17 May 2024 06:00) (12 - 18)  SpO2: 99% (17 May 2024 06:00) (95% - 100%)    Parameters below as of 17 May 2024 04:00  Patient On (Oxygen Delivery Method): room air        Physical Exam:  Alert, NAD  Nonlabored Respirations RA, no stridor or stertor   OC/OP: left buccal flap with good color and turgor  Neck: soft/flat, incision c/d/i, 6CN trach in place   Neuro: CN II-XII grossly intact   Cook signal strong  Neck JPs w SS drainage  L arm in ace wrap      I&O's Summary    16 May 2024 07:01  -  17 May 2024 07:00  --------------------------------------------------------  IN: 500 mL / OUT: 1300 mL / NET: -800 mL            LABS:                        11.2   10.02 )-----------( 196      ( 16 May 2024 22:00 )             34.2     05-16    139  |  104  |  9   ----------------------------<  171<H>  3.8   |  23  |  0.65    Ca    8.5      16 May 2024 22:00  Phos  2.9     05-16  Mg     1.70     05-16      PT/INR - ( 16 May 2024 22:00 )   PT: 13.5 sec;   INR: 1.20 ratio         PTT - ( 16 May 2024 22:00 )  PTT:33.4 sec          A/P: 38yMale with newly diagnosed L buccal invasive SCC s/p L buccal composite rxn, SND, trach, L RFFF, L STSG 5/16, recovering well in SICU.    - ERAS protocol  - q1h flap checks  - follow up CXR for NGT placement  - pain control as needed

## 2024-05-17 NOTE — PHYSICAL THERAPY INITIAL EVALUATION ADULT - GENERAL OBSERVATIONS, REHAB EVAL
Pt encountered semireclined in bed in no distress, +telemetry monitor, +trach collar, +doppler, +VAC, +drain x2. SpO2 = 98%.

## 2024-05-18 LAB
ANION GAP SERPL CALC-SCNC: 11 MMOL/L — SIGNIFICANT CHANGE UP (ref 7–14)
BLD GP AB SCN SERPL QL: NEGATIVE — SIGNIFICANT CHANGE UP
BUN SERPL-MCNC: 13 MG/DL — SIGNIFICANT CHANGE UP (ref 7–23)
CALCIUM SERPL-MCNC: 8.1 MG/DL — LOW (ref 8.4–10.5)
CHLORIDE SERPL-SCNC: 108 MMOL/L — HIGH (ref 98–107)
CO2 SERPL-SCNC: 24 MMOL/L — SIGNIFICANT CHANGE UP (ref 22–31)
CREAT SERPL-MCNC: 0.7 MG/DL — SIGNIFICANT CHANGE UP (ref 0.5–1.3)
EGFR: 121 ML/MIN/1.73M2 — SIGNIFICANT CHANGE UP
GLUCOSE SERPL-MCNC: 136 MG/DL — HIGH (ref 70–99)
HCT VFR BLD CALC: 31.9 % — LOW (ref 39–50)
HGB BLD-MCNC: 10.4 G/DL — LOW (ref 13–17)
MAGNESIUM SERPL-MCNC: 2.4 MG/DL — SIGNIFICANT CHANGE UP (ref 1.6–2.6)
MCHC RBC-ENTMCNC: 26.5 PG — LOW (ref 27–34)
MCHC RBC-ENTMCNC: 32.6 GM/DL — SIGNIFICANT CHANGE UP (ref 32–36)
MCV RBC AUTO: 81.4 FL — SIGNIFICANT CHANGE UP (ref 80–100)
NRBC # BLD: 0 /100 WBCS — SIGNIFICANT CHANGE UP (ref 0–0)
NRBC # FLD: 0 K/UL — SIGNIFICANT CHANGE UP (ref 0–0)
PHOSPHATE SERPL-MCNC: 2 MG/DL — LOW (ref 2.5–4.5)
PLATELET # BLD AUTO: 216 K/UL — SIGNIFICANT CHANGE UP (ref 150–400)
POTASSIUM SERPL-MCNC: 3.3 MMOL/L — LOW (ref 3.5–5.3)
POTASSIUM SERPL-SCNC: 3.3 MMOL/L — LOW (ref 3.5–5.3)
RBC # BLD: 3.92 M/UL — LOW (ref 4.2–5.8)
RBC # FLD: 12.1 % — SIGNIFICANT CHANGE UP (ref 10.3–14.5)
RH IG SCN BLD-IMP: POSITIVE — SIGNIFICANT CHANGE UP
SODIUM SERPL-SCNC: 143 MMOL/L — SIGNIFICANT CHANGE UP (ref 135–145)
WBC # BLD: 9.23 K/UL — SIGNIFICANT CHANGE UP (ref 3.8–10.5)
WBC # FLD AUTO: 9.23 K/UL — SIGNIFICANT CHANGE UP (ref 3.8–10.5)

## 2024-05-18 PROCEDURE — 99232 SBSQ HOSP IP/OBS MODERATE 35: CPT | Mod: GC

## 2024-05-18 RX ORDER — POTASSIUM PHOSPHATE, MONOBASIC POTASSIUM PHOSPHATE, DIBASIC 236; 224 MG/ML; MG/ML
15 INJECTION, SOLUTION INTRAVENOUS ONCE
Refills: 0 | Status: COMPLETED | OUTPATIENT
Start: 2024-05-18 | End: 2024-05-18

## 2024-05-18 RX ORDER — POTASSIUM CHLORIDE 20 MEQ
40 PACKET (EA) ORAL ONCE
Refills: 0 | Status: COMPLETED | OUTPATIENT
Start: 2024-05-18 | End: 2024-05-18

## 2024-05-18 RX ORDER — POTASSIUM CHLORIDE 20 MEQ
40 PACKET (EA) ORAL ONCE
Refills: 0 | Status: DISCONTINUED | OUTPATIENT
Start: 2024-05-18 | End: 2024-05-18

## 2024-05-18 RX ADMIN — Medication 1000 MILLIGRAM(S): at 12:30

## 2024-05-18 RX ADMIN — POTASSIUM PHOSPHATE, MONOBASIC POTASSIUM PHOSPHATE, DIBASIC 62.5 MILLIMOLE(S): 236; 224 INJECTION, SOLUTION INTRAVENOUS at 02:18

## 2024-05-18 RX ADMIN — OXYCODONE HYDROCHLORIDE 5 MILLIGRAM(S): 5 TABLET ORAL at 06:50

## 2024-05-18 RX ADMIN — PANTOPRAZOLE SODIUM 40 MILLIGRAM(S): 20 TABLET, DELAYED RELEASE ORAL at 11:53

## 2024-05-18 RX ADMIN — Medication 1000 MILLIGRAM(S): at 06:50

## 2024-05-18 RX ADMIN — OXYCODONE HYDROCHLORIDE 10 MILLIGRAM(S): 5 TABLET ORAL at 18:06

## 2024-05-18 RX ADMIN — OXYCODONE HYDROCHLORIDE 10 MILLIGRAM(S): 5 TABLET ORAL at 17:36

## 2024-05-18 RX ADMIN — Medication 40 MILLIEQUIVALENT(S): at 02:18

## 2024-05-18 RX ADMIN — Medication 1000 MILLIGRAM(S): at 18:06

## 2024-05-18 RX ADMIN — CHLORHEXIDINE GLUCONATE 15 MILLILITER(S): 213 SOLUTION TOPICAL at 17:36

## 2024-05-18 RX ADMIN — Medication 1000 MILLIGRAM(S): at 11:52

## 2024-05-18 RX ADMIN — Medication 1000 MILLIGRAM(S): at 06:20

## 2024-05-18 RX ADMIN — CHLORHEXIDINE GLUCONATE 1 APPLICATION(S): 213 SOLUTION TOPICAL at 11:51

## 2024-05-18 RX ADMIN — ENOXAPARIN SODIUM 40 MILLIGRAM(S): 100 INJECTION SUBCUTANEOUS at 02:17

## 2024-05-18 RX ADMIN — Medication 1000 MILLIGRAM(S): at 23:32

## 2024-05-18 RX ADMIN — Medication 1000 MILLIGRAM(S): at 17:36

## 2024-05-18 RX ADMIN — OXYCODONE HYDROCHLORIDE 10 MILLIGRAM(S): 5 TABLET ORAL at 11:00

## 2024-05-18 RX ADMIN — OXYCODONE HYDROCHLORIDE 10 MILLIGRAM(S): 5 TABLET ORAL at 23:40

## 2024-05-18 RX ADMIN — OXYCODONE HYDROCHLORIDE 10 MILLIGRAM(S): 5 TABLET ORAL at 10:28

## 2024-05-18 RX ADMIN — CHLORHEXIDINE GLUCONATE 15 MILLILITER(S): 213 SOLUTION TOPICAL at 06:20

## 2024-05-18 RX ADMIN — SENNA PLUS 10 MILLILITER(S): 8.6 TABLET ORAL at 21:07

## 2024-05-18 RX ADMIN — POLYETHYLENE GLYCOL 3350 17 GRAM(S): 17 POWDER, FOR SOLUTION ORAL at 21:10

## 2024-05-18 RX ADMIN — GABAPENTIN 600 MILLIGRAM(S): 400 CAPSULE ORAL at 11:58

## 2024-05-18 RX ADMIN — OXYCODONE HYDROCHLORIDE 5 MILLIGRAM(S): 5 TABLET ORAL at 06:20

## 2024-05-18 NOTE — PROGRESS NOTE ADULT - ASSESSMENT
38M with PMHx chewing tobacco (stopped 1 month ago) and alcohol use (no longer drinker)  presented to [facility] with 1 month of left sided jaw and face pain. He reports having difficulty chewing due to the pain and lost 7 kilos over the past few months. CT Head and Neck revealed mass involving buccal mucosa extending into the area of the parotid, found to be invasive SCC. Patient transferred to [facility] ENT service for surgery. Patient is s/p fresh trach, left buccal resection of SCC on the maxilla and mandible, left neck dissection, right neck dissection for vessels, left forearm free flap and left thigh StSG. Patient has two NATTY drains and wound vac on the left arm.     Plan  Neurologic   - Pain control with tylenol, gabapentin, and oxycodone prn    Respiratory:   - trach collar  - SpO2 > 92%     Cardiovascular:   - SBP under 140  - q2 Flap checks    Gastrointestinal/Nutrition:   - NPO with TFs  - Protonix 40 QD  - Bowel regimen: Senna and miralax  - Zofran PRN    Renal/Genitourinary:   - monitor urine output  - replete lytes    Hematologic:   - Lovenox for DVT ppx    Infectious Disease:   - s/p darío-op Unasyn     Endocrine:   - Monitor glucose on bmp    Tubes/Lines/Drains:   - PIV   38M with PMHx chewing tobacco (stopped 1 month ago) and alcohol use (no longer drinker)  presented to [facility] with 1 month of left sided jaw and face pain. He reports having difficulty chewing due to the pain and lost 7 kilos over the past few months. CT Head and Neck revealed mass involving buccal mucosa extending into the area of the parotid, found to be invasive SCC. Patient transferred to [facility] ENT service for surgery. Patient is s/p fresh trach, left buccal resection of SCC on the maxilla and mandible, left neck dissection, right neck dissection for vessels, left forearm free flap and left thigh StSG. Patient has two NATTY drains and wound vac on the left arm.     Plan:  Neurologic   - Pain control with tylenol, gabapentin, and oxycodone prn    Respiratory:   - trach collar  - SpO2 > 92%     Cardiovascular:   - SBP under 140  - q2 Flap checks    Gastrointestinal/Nutrition:   - NPO with TFs  - Protonix 40 QD  - Bowel regimen: Senna and miralax  - Zofran PRN    Renal/Genitourinary:   - monitor urine output  - replete lytes    Hematologic:   - Lovenox for DVT ppx    Infectious Disease:   - s/p darío-op Unasyn     Endocrine:   - Monitor glucose on bmp    Tubes/Lines/Drains:   - PIV    Dispo: SICU, list tonight

## 2024-05-18 NOTE — PROGRESS NOTE ADULT - ATTENDING COMMENTS
I agree with the history, physical, and plan, which I have reviewed and edited where appropriate.  I agree with notes/assessment of health care providers on my service.  I have personally examined the patient.  I was physically present for the key portions of the evaluation and management (E/M) service provided.  I reviewed data and laboratory tests/x-rays and all pertinent electronic images.  The patient is a critical care patient with life threatening hemodynamic and metabolic instability in SICU.  Risk benefit analyses discussed.    The patient is in SICU with diagnosis mentioned in the note.    The plan is specified below.      38M w PMHx chewing tobacco (stopped 1 month ago) and alcohol use (no longer drinker)  presented to [facility] with 1 month of left sided jaw and face pain. CT Head and Neck revealed mass involving buccal mucosa extending into the area of the parotid, found to be invasive SCC. Patient transferred to [facility] ENT service for surgery.     Now s/p fresh trach, left buccal resection of SCC on the maxilla and mandible, left neck dissection, right neck dissection for vessels, left forearm free flap and left thigh StSG. Patient has two NATTY drains and wound vac on the left arm.     Neurologic   - Pain control with tylenol, gabapentin, and dilaudid prn    Respiratory:   - trach collar     Cardiovascular: HTN  - labetalol prn SBP >150    Gastrointestinal/Nutrition:   - NPO with TFs  - Protonix 40 QD  - Bowel regimen: Senna and miralax  - Zofran PRN    Renal/Genitourinary:   - D/C Nieves today  - IVL once on TF     Hematologic:   - Lovenox for DVT ppx    Infectious Disease:   - Unasyn x 3 doses    Endocrine:   - Monitor glucose on bmp
I agree with the history, physical, and plan, which I have reviewed and edited where appropriate.  I agree with notes/assessment of health care providers on my service.  I have personally examined the patient.  I was physically present for the key portions of the evaluation and management (E/M) service provided.  I reviewed data and laboratory tests/x-rays and all pertinent electronic images.  The patient is a critical care patient with life threatening hemodynamic and metabolic instability in SICU.  Risk benefit analyses discussed.    The patient is in SICU with diagnosis mentioned in the note.    The plan is specified below.      38M w PMHx chewing tobacco (stopped 1 month ago) and alcohol use (no longer drinker)  presented to [facility] with 1 month of left sided jaw and face pain. CT Head and Neck revealed mass involving buccal mucosa extending into the area of the parotid, found to be invasive SCC. Patient transferred to [facility] ENT service for surgery.     Now s/p fresh trach, left buccal resection of SCC on the maxilla and mandible, left neck dissection, right neck dissection for vessels, left forearm free flap and left thigh StSG. Patient has two NATTY drains and wound vac on the left arm.     Neurologic   - Pain control with tylenol, gabapentin, and oxycodone prn    Respiratory:  respiratory abnormality, s/p tracheostomy   - trach collar     Cardiovascular: HTN  - SBP goal < 140     Gastrointestinal/Nutrition: at risk for malnutrition  - NPO with TFs  - Protonix 40 QD  - Bowel regimen: Senna and miralax  - Zofran PRN    Renal/Genitourinary:   - voiding  - IVL     Hematologic:   - Lovenox for DVT ppx    Infectious Disease:   - observe off abx     Endocrine:   - Monitor glucose on bmp .

## 2024-05-18 NOTE — PROGRESS NOTE ADULT - ASSESSMENT
38yMale with newly diagnosed L buccal invasive SCC s/p L buccal composite rxn, SND, trach, L RFFF, L STSG 5/16, recovering well in SICU.    - ERAS protocol  - may advance to q2h checks with cook and exam  - cw vac to LUE  - pain control as needed  - keep >    Tracie Knowles MD  Plastic and Reconstructive Surgery, PGY-1  [facility]: [id number]  [facility]: [phone]

## 2024-05-18 NOTE — PROGRESS NOTE ADULT - SUBJECTIVE AND OBJECTIVE BOX
ENT Progress Note    Interval: NAEO. AVSS. POD2 s/p buccal rxn, trach, radial forearm free flap reconstruction doing well in SICU.       MEDICATIONS  (STANDING):  acetaminophen   Oral Liquid .. 1000 milliGRAM(s) Enteral Tube every 6 hours  chlorhexidine 0.12% Liquid 15 milliLiter(s) Swish and Spit two times a day  chlorhexidine 2% Cloths 1 Application(s) Topical daily  enoxaparin Injectable 40 milliGRAM(s) SubCutaneous every 24 hours  gabapentin Solution 600 milliGRAM(s) Oral daily  pantoprazole  Injectable 40 milliGRAM(s) IV Push daily  polyethylene glycol 3350 17 Gram(s) Oral daily  senna Syrup 10 milliLiter(s) Oral daily    MEDICATIONS  (PRN):  ondansetron Injectable 4 milliGRAM(s) IV Push every 8 hours PRN Nausea and/or Vomiting  oxyCODONE    Solution 5 milliGRAM(s) Enteral Tube every 6 hours PRN Moderate Pain (4 - 6)  oxyCODONE    Solution 10 milliGRAM(s) Enteral Tube every 6 hours PRN Severe Pain (7 - 10)        Vital Signs Last 24 Hrs  T(C): 37.5 (18 May 2024 04:00), Max: 37.5 (18 May 2024 04:00)  T(F): 99.5 (18 May 2024 04:00), Max: 99.5 (18 May 2024 04:00)  HR: 89 (18 May 2024 07:00) (65 - 104)  BP: 121/68 (18 May 2024 07:00) (96/60 - 126/68)  BP(mean): 83 (18 May 2024 07:00) (70 - 88)  RR: 11 (18 May 2024 07:00) (10 - 28)  SpO2: 98% (18 May 2024 07:00) (96% - 100%)    Parameters below as of 18 May 2024 07:00  Patient On (Oxygen Delivery Method): tracheostomy collar  O2 Flow (L/min): 6  O2 Concentration (%): 28    Physical Exam:  Alert, NAD  Nonlabored Respirations RA, no stridor or stertor   OC/OP: left buccal flap with good color and turgor  Neck: soft/flat, incision c/d/i, 6CN trach in place   Neuro: CN II-XII grossly intact   Cook signal strong  Neck JPs w SS drainage  L arm in ace wrap        05-17-24 @ 07:01  -  05-18-24 @ 07:00  --------------------------------------------------------  IN: 1738 mL / OUT: 1095 mL / NET: 643 mL                              10.4   9.23  )-----------( 216      ( 18 May 2024 01:24 )             31.9    05-18    143  |  108<H>  |  13  ----------------------------<  136<H>  3.3<L>   |  24  |  0.70    Ca    8.1<L>      18 May 2024 01:24  Phos  2.0     05-18  Mg     2.40     05-18     PT/INR - ( 16 May 2024 22:00 )   PT: 13.5 sec;   INR: 1.20 ratio         PTT - ( 16 May 2024 22:00 )  PTT:33.4 sec

## 2024-05-18 NOTE — PROGRESS NOTE ADULT - SUBJECTIVE AND OBJECTIVE BOX
Interval Events:  - NGT adjusted  - Start TF Pivot Goal 43   - D/C A line   - DC soto  - DC steroids  - PT outpatient PT  - q2 hour flap checks    INTERVAL EVENTS:    SUBJECTIVE/ROS:  [ ] A ten-point review of systems was otherwise negative except as noted.  [ ] Due to altered mental status/intubation, subjective information were not able to be obtained from the patient. History was obtained, to the extent possible, from review of the chart and collateral sources of information.    NEURO/HEENT  Exam: awake, alert, b/l neck drains serosang, doppler in place, surgical incisions approximated with staples  Meds: acetaminophen   Oral Liquid .. 1000 milliGRAM(s) Enteral Tube every 6 hours  gabapentin Solution 600 milliGRAM(s) Oral daily  ondansetron Injectable 4 milliGRAM(s) IV Push every 8 hours PRN Nausea and/or Vomiting  oxyCODONE    Solution 5 milliGRAM(s) Enteral Tube every 6 hours PRN Moderate Pain (4 - 6)  oxyCODONE    Solution 10 milliGRAM(s) Enteral Tube every 6 hours PRN Severe Pain (7 - 10)      RESPIRATORY  RR: 17 (05-17-24 @ 23:00) (11 - 28)  SpO2: 100% (05-17-24 @ 23:00) (96% - 100%)  Exam: unlabored, no increased work of breathing, trach collar    CARDIOVASCULAR  HR: 88 (05-17-24 @ 23:00) (65 - 98)  BP: 102/61 (05-17-24 @ 23:00) (96/60 - 126/68)  BP(mean): 74 (05-17-24 @ 23:00) (70 - 86)  ABP: 131/66 (05-17-24 @ 09:00) (111/65 - 152/80)  ABP(mean): 87 (05-17-24 @ 09:00) (80 - 105)  Wt(kg): --  CVP(cm H2O): --      Exam: regular rate and rhythm  Cardiac Rhythm: sinus  Perfusion     [x]Adequate   [ ]Inadequate  Mentation   [x]Normal       [ ]Reduced  Extremities  [x]Warm         [ ]Cool  Volume Status [ ]Hypervolemic [x]Euvolemic [ ]Hypovolemic  Meds:     GI/NUTRITION  Exam: soft, nondistended, NGT   Diet: Tube Feeds  Meds: pantoprazole  Injectable 40 milliGRAM(s) IV Push daily  polyethylene glycol 3350 17 Gram(s) Oral daily  senna Syrup 10 milliLiter(s) Oral daily      GENITOURINARY  I&O's Detail    05-16 @ 07:01  -  05-17 @ 07:00  --------------------------------------------------------  IN:    IV PiggyBack: 300 mL    Lactated Ringers: 200 mL  Total IN: 500 mL    OUT:    Bulb (mL): 30 mL    Bulb (mL): 20 mL    Indwelling Catheter - Urethral (mL): 1250 mL    VAC (Vacuum Assisted Closure) System (mL): 0 mL  Total OUT: 1300 mL    Total NET: -800 mL      05-17 @ 07:01  -  05-18 @ 00:12  --------------------------------------------------------  IN:    IV PiggyBack: 100 mL    Lactated Ringers: 600 mL    Pivot 1.5: 487 mL  Total IN: 1187 mL    OUT:    Bulb (mL): 10 mL    Bulb (mL): 10 mL    Indwelling Catheter - Urethral (mL): 250 mL    VAC (Vacuum Assisted Closure) System (mL): 0 mL    Voided (mL): 750 mL  Total OUT: 1020 mL    Total NET: 167 mL          05-17    139  |  104  |  8   ----------------------------<  135<H>  3.8   |  23  |  0.70    Ca    8.4      17 May 2024 06:30  Phos  2.7     05-17  Mg     1.80     05-17      [ ] Soto catheter, indication: N/A  Meds:     HEMATOLOGIC  Meds: enoxaparin Injectable 40 milliGRAM(s) SubCutaneous every 24 hours    [x] VTE Prophylaxis                        10.9   10.57 )-----------( 223      ( 17 May 2024 06:30 )             32.3     PT/INR - ( 16 May 2024 22:00 )   PT: 13.5 sec;   INR: 1.20 ratio         PTT - ( 16 May 2024 22:00 )  PTT:33.4 sec  Transfusion     [ ] PRBC   [ ] Platelets   [ ] FFP   [ ] Cryoprecipitate    INFECTIOUS DISEASES  WBC Count: 10.57 K/uL (05-17 @ 06:30)    RECENT CULTURES:    Meds:     ENDOCRINE  CAPILLARY BLOOD GLUCOSE       Interval Events:  - NGT adjusted  - Start TF Pivot Goal 43   - D/C A line   - DC soto  - DC steroids  - PT outpatient PT  - Q4 neurochecks tonight  - List tonight      INTERVAL EVENTS:    SUBJECTIVE/ROS:  [ ] A ten-point review of systems was otherwise negative except as noted.  [ ] Due to altered mental status/intubation, subjective information were not able to be obtained from the patient. History was obtained, to the extent possible, from review of the chart and collateral sources of information.    NEURO/HEENT  Exam: awake, alert, b/l neck drains serosang, doppler in place, surgical incisions approximated with staples  Meds: acetaminophen   Oral Liquid .. 1000 milliGRAM(s) Enteral Tube every 6 hours  gabapentin Solution 600 milliGRAM(s) Oral daily  ondansetron Injectable 4 milliGRAM(s) IV Push every 8 hours PRN Nausea and/or Vomiting  oxyCODONE    Solution 5 milliGRAM(s) Enteral Tube every 6 hours PRN Moderate Pain (4 - 6)  oxyCODONE    Solution 10 milliGRAM(s) Enteral Tube every 6 hours PRN Severe Pain (7 - 10)      RESPIRATORY  RR: 17 (05-17-24 @ 23:00) (11 - 28)  SpO2: 100% (05-17-24 @ 23:00) (96% - 100%)  Exam: unlabored, no increased work of breathing, trach collar    CARDIOVASCULAR  HR: 88 (05-17-24 @ 23:00) (65 - 98)  BP: 102/61 (05-17-24 @ 23:00) (96/60 - 126/68)  BP(mean): 74 (05-17-24 @ 23:00) (70 - 86)  ABP: 131/66 (05-17-24 @ 09:00) (111/65 - 152/80)  ABP(mean): 87 (05-17-24 @ 09:00) (80 - 105)  Wt(kg): --  CVP(cm H2O): --      Exam: regular rate and rhythm  Cardiac Rhythm: sinus  Perfusion     [x]Adequate   [ ]Inadequate  Mentation   [x]Normal       [ ]Reduced  Extremities  [x]Warm         [ ]Cool  Volume Status [ ]Hypervolemic [x]Euvolemic [ ]Hypovolemic  Meds:     GI/NUTRITION  Exam: soft, nondistended, NGT   Diet: Tube Feeds  Meds: pantoprazole  Injectable 40 milliGRAM(s) IV Push daily  polyethylene glycol 3350 17 Gram(s) Oral daily  senna Syrup 10 milliLiter(s) Oral daily      GENITOURINARY  I&O's Detail    05-16 @ 07:01  -  05-17 @ 07:00  --------------------------------------------------------  IN:    IV PiggyBack: 300 mL    Lactated Ringers: 200 mL  Total IN: 500 mL    OUT:    Bulb (mL): 30 mL    Bulb (mL): 20 mL    Indwelling Catheter - Urethral (mL): 1250 mL    VAC (Vacuum Assisted Closure) System (mL): 0 mL  Total OUT: 1300 mL    Total NET: -800 mL      05-17 @ 07:01  -  05-18 @ 00:12  --------------------------------------------------------  IN:    IV PiggyBack: 100 mL    Lactated Ringers: 600 mL    Pivot 1.5: 487 mL  Total IN: 1187 mL    OUT:    Bulb (mL): 10 mL    Bulb (mL): 10 mL    Indwelling Catheter - Urethral (mL): 250 mL    VAC (Vacuum Assisted Closure) System (mL): 0 mL    Voided (mL): 750 mL  Total OUT: 1020 mL    Total NET: 167 mL          05-17    139  |  104  |  8   ----------------------------<  135<H>  3.8   |  23  |  0.70    Ca    8.4      17 May 2024 06:30  Phos  2.7     05-17  Mg     1.80     05-17      [ ] Soto catheter, indication: N/A  Meds:     HEMATOLOGIC  Meds: enoxaparin Injectable 40 milliGRAM(s) SubCutaneous every 24 hours    [x] VTE Prophylaxis                        10.9   10.57 )-----------( 223      ( 17 May 2024 06:30 )             32.3     PT/INR - ( 16 May 2024 22:00 )   PT: 13.5 sec;   INR: 1.20 ratio         PTT - ( 16 May 2024 22:00 )  PTT:33.4 sec  Transfusion     [ ] PRBC   [ ] Platelets   [ ] FFP   [ ] Cryoprecipitate    INFECTIOUS DISEASES  WBC Count: 10.57 K/uL (05-17 @ 06:30)    RECENT CULTURES:    Meds:     ENDOCRINE  CAPILLARY BLOOD GLUCOSE

## 2024-05-18 NOTE — PROGRESS NOTE ADULT - ASSESSMENT
A/P: 38yMale with newly diagnosed L buccal invasive SCC s/p L buccal composite rxn, SND, trach, L RFFF, L STSG 5/16, recovering well in SICU.    - ERAS protocol  - q1h flap checks  - pain control as needed

## 2024-05-18 NOTE — PROGRESS NOTE ADULT - SUBJECTIVE AND OBJECTIVE BOX
Plastic Surgery Progress Note (pg [facility]: [id number], NS: [phone])    SUBJECTIVE  NAEO. AVSS. Pt comfortable in bed. Pain well controlled, no complaints.    OBJECTIVE  ___________________________________________________  VITAL SIGNS / I&O's   Vital Signs Last 24 Hrs  T(C): 37.5 (18 May 2024 04:00), Max: 37.5 (18 May 2024 04:00)  T(F): 99.5 (18 May 2024 04:00), Max: 99.5 (18 May 2024 04:00)  HR: 89 (18 May 2024 07:00) (65 - 104)  BP: 121/68 (18 May 2024 07:00) (96/60 - 126/68)  BP(mean): 83 (18 May 2024 07:00) (70 - 88)  RR: 11 (18 May 2024 07:00) (10 - 28)  SpO2: 98% (18 May 2024 07:00) (96% - 100%)    Parameters below as of 18 May 2024 07:00  Patient On (Oxygen Delivery Method): tracheostomy collar  O2 Flow (L/min): 6  O2 Concentration (%): 28      17 May 2024 07:01  -  18 May 2024 07:00  --------------------------------------------------------  IN:    IV PiggyBack: 350 mL    Lactated Ringers: 600 mL    Pivot 1.5: 788 mL  Total IN: 1738 mL    OUT:    Bulb (mL): 20 mL    Bulb (mL): 75 mL    Indwelling Catheter - Urethral (mL): 250 mL    VAC (Vacuum Assisted Closure) System (mL): 0 mL    Voided (mL): 750 mL  Total OUT: 1095 mL    Total NET: 643 mL        ___________________________________________________  PHYSICAL EXAM  -- CONSTITUTIONAL: NAD, lying in bed  -- NEURO: Awake, alert  -- HEENT:  intraoral flap appropriate color, no congestion  neck soft, no collections  incision cdi  cook doppler +   JPx2 ss  LUE: vac in place holding suction  ___________________________________________________  LABS                        10.4   9.23  )-----------( 216      ( 18 May 2024 01:24 )             31.9     18 May 2024 01:24    143    |  108    |  13     ----------------------------<  136    3.3     |  24     |  0.70     Ca    8.1        18 May 2024 01:24  Phos  2.0       18 May 2024 01:24  Mg     2.40      18 May 2024 01:24      PT/INR - ( 16 May 2024 22:00 )   PT: 13.5 sec;   INR: 1.20 ratio         PTT - ( 16 May 2024 22:00 )  PTT:33.4 sec  CAPILLARY BLOOD GLUCOSE            Urinalysis Basic - ( 18 May 2024 01:24 )    Color: x / Appearance: x / SG: x / pH: x  Gluc: 136 mg/dL / Ketone: x  / Bili: x / Urobili: x   Blood: x / Protein: x / Nitrite: x   Leuk Esterase: x / RBC: x / WBC x   Sq Epi: x / Non Sq Epi: x / Bacteria: x      ___________________________________________________  MICRO  Recent Cultures:    ___________________________________________________  MEDICATIONS  (STANDING):  acetaminophen   Oral Liquid .. 1000 milliGRAM(s) Enteral Tube every 6 hours  chlorhexidine 0.12% Liquid 15 milliLiter(s) Swish and Spit two times a day  chlorhexidine 2% Cloths 1 Application(s) Topical daily  enoxaparin Injectable 40 milliGRAM(s) SubCutaneous every 24 hours  gabapentin Solution 600 milliGRAM(s) Oral daily  pantoprazole  Injectable 40 milliGRAM(s) IV Push daily  polyethylene glycol 3350 17 Gram(s) Oral daily  senna Syrup 10 milliLiter(s) Oral daily    MEDICATIONS  (PRN):  ondansetron Injectable 4 milliGRAM(s) IV Push every 8 hours PRN Nausea and/or Vomiting  oxyCODONE    Solution 5 milliGRAM(s) Enteral Tube every 6 hours PRN Moderate Pain (4 - 6)  oxyCODONE    Solution 10 milliGRAM(s) Enteral Tube every 6 hours PRN Severe Pain (7 - 10)

## 2024-05-19 LAB
ANION GAP SERPL CALC-SCNC: 11 MMOL/L — SIGNIFICANT CHANGE UP (ref 7–14)
BUN SERPL-MCNC: 14 MG/DL — SIGNIFICANT CHANGE UP (ref 7–23)
CALCIUM SERPL-MCNC: 8.5 MG/DL — SIGNIFICANT CHANGE UP (ref 8.4–10.5)
CHLORIDE SERPL-SCNC: 109 MMOL/L — HIGH (ref 98–107)
CO2 SERPL-SCNC: 22 MMOL/L — SIGNIFICANT CHANGE UP (ref 22–31)
CREAT SERPL-MCNC: 0.65 MG/DL — SIGNIFICANT CHANGE UP (ref 0.5–1.3)
EGFR: 123 ML/MIN/1.73M2 — SIGNIFICANT CHANGE UP
GLUCOSE SERPL-MCNC: 133 MG/DL — HIGH (ref 70–99)
HCT VFR BLD CALC: 31.5 % — LOW (ref 39–50)
HGB BLD-MCNC: 10 G/DL — LOW (ref 13–17)
MAGNESIUM SERPL-MCNC: 2.1 MG/DL — SIGNIFICANT CHANGE UP (ref 1.6–2.6)
MCHC RBC-ENTMCNC: 26.8 PG — LOW (ref 27–34)
MCHC RBC-ENTMCNC: 31.7 GM/DL — LOW (ref 32–36)
MCV RBC AUTO: 84.5 FL — SIGNIFICANT CHANGE UP (ref 80–100)
NRBC # BLD: 0 /100 WBCS — SIGNIFICANT CHANGE UP (ref 0–0)
NRBC # FLD: 0 K/UL — SIGNIFICANT CHANGE UP (ref 0–0)
PHOSPHATE SERPL-MCNC: 3 MG/DL — SIGNIFICANT CHANGE UP (ref 2.5–4.5)
PLATELET # BLD AUTO: 246 K/UL — SIGNIFICANT CHANGE UP (ref 150–400)
POTASSIUM SERPL-MCNC: 4 MMOL/L — SIGNIFICANT CHANGE UP (ref 3.5–5.3)
POTASSIUM SERPL-SCNC: 4 MMOL/L — SIGNIFICANT CHANGE UP (ref 3.5–5.3)
RBC # BLD: 3.73 M/UL — LOW (ref 4.2–5.8)
RBC # FLD: 12.2 % — SIGNIFICANT CHANGE UP (ref 10.3–14.5)
SODIUM SERPL-SCNC: 142 MMOL/L — SIGNIFICANT CHANGE UP (ref 135–145)
WBC # BLD: 9.61 K/UL — SIGNIFICANT CHANGE UP (ref 3.8–10.5)
WBC # FLD AUTO: 9.61 K/UL — SIGNIFICANT CHANGE UP (ref 3.8–10.5)

## 2024-05-19 RX ADMIN — ENOXAPARIN SODIUM 40 MILLIGRAM(S): 100 INJECTION SUBCUTANEOUS at 02:42

## 2024-05-19 RX ADMIN — Medication 1000 MILLIGRAM(S): at 05:00

## 2024-05-19 RX ADMIN — CHLORHEXIDINE GLUCONATE 15 MILLILITER(S): 213 SOLUTION TOPICAL at 05:00

## 2024-05-19 RX ADMIN — Medication 1000 MILLIGRAM(S): at 18:08

## 2024-05-19 RX ADMIN — CHLORHEXIDINE GLUCONATE 15 MILLILITER(S): 213 SOLUTION TOPICAL at 18:08

## 2024-05-19 RX ADMIN — OXYCODONE HYDROCHLORIDE 10 MILLIGRAM(S): 5 TABLET ORAL at 00:00

## 2024-05-19 RX ADMIN — Medication 1000 MILLIGRAM(S): at 12:10

## 2024-05-19 RX ADMIN — Medication 1000 MILLIGRAM(S): at 18:40

## 2024-05-19 RX ADMIN — Medication 1000 MILLIGRAM(S): at 11:36

## 2024-05-19 RX ADMIN — PANTOPRAZOLE SODIUM 40 MILLIGRAM(S): 20 TABLET, DELAYED RELEASE ORAL at 11:36

## 2024-05-19 RX ADMIN — GABAPENTIN 600 MILLIGRAM(S): 400 CAPSULE ORAL at 11:36

## 2024-05-19 NOTE — CHART NOTE - NSCHARTNOTEFT_GEN_A_CORE
SICU CHART NOTE    Patient signed out to ENT Team on transfer to floor this morning. Verbal sign out between myself and resident. All questions answered.     Kadeem Levine  SICU [id number]

## 2024-05-19 NOTE — PROGRESS NOTE ADULT - ASSESSMENT
38M with PMHx chewing tobacco (stopped 1 month ago) and alcohol use (no longer drinker)  presented to [facility] with 1 month of left sided jaw and face pain. He reports having difficulty chewing due to the pain and lost 7 kilos over the past few months. CT Head and Neck revealed mass involving buccal mucosa extending into the area of the parotid, found to be invasive SCC. Patient transferred to [facility] ENT service for surgery. Patient is s/p fresh trach, left buccal resection of SCC on the maxilla and mandible, left neck dissection, right neck dissection for vessels, left forearm free flap and left thigh StSG on 5/16. Patient has two NATTY drains and wound vac on the left arm.     Plan  Neurologic   - Pain control with tylenol, gabapentin, and oxycodone prn    Respiratory:   - trach collar  - SpO2 > 92%     Cardiovascular:   - SBP under 140  - q4 flap checks    Gastrointestinal/Nutrition:   - NPO with TFs  - Protonix 40 QD  - Bowel regimen: Senna and miralax  - Zofran PRN    Renal/Genitourinary:   - monitor urine output  - replete lytes    Hematologic:   - Lovenox for DVT ppx    Infectious Disease:   - s/p darío-op Unasyn     Endocrine:   - Monitor glucose on bmp    Tubes/Lines/Drains:   - PIV    Dispo: Listed

## 2024-05-19 NOTE — PROGRESS NOTE ADULT - SUBJECTIVE AND OBJECTIVE BOX
OTOLARYNGOLOGY (ENT) PROGRESS NOTE    PATIENT: BERNA BENDER  MRN: [id number]  : [birth year]  [id number]  DATE OF SERVICE:  24  	  Subjective/ Interval:   AFVSS. No acute events overnight. Patient seen and examined at bedside. Pain is controlled. Listed for the floors.    Physical Exam:  Alert, NAD  Nonlabored Respirations RA, no stridor or stertor   OC/OP: left buccal flap with good color and turgor  Neck: soft/flat, incision c/d/i, 6CN trach in place   [employer] signal strong  Neck JPs w/ SS drainage  L arm in ace wrap     LABS                       10.0   9.61  )-----------( 246      ( 19 May 2024 00:30 )             31.5        142  |  109<H>  |  14  ----------------------------<  133<H>  4.0   |  22  |  0.65    Ca    8.5      19 May 2024 01:36  Phos  3.0       Mg     2.10                Coagulation Studies-     Urinalysis Basic - ( 19 May 2024 01:36 )    Color: x / Appearance: x / SG: x / pH: x  Gluc: 133 mg/dL / Ketone: x  / Bili: x / Urobili: x   Blood: x / Protein: x / Nitrite: x   Leuk Esterase: x / RBC: x / WBC x   Sq Epi: x / Non Sq Epi: x / Bacteria: x      Endocrine Panel-  Calcium: 8.5 mg/dL ( @ 01:36)                MICROBIOLOGY:

## 2024-05-19 NOTE — PROGRESS NOTE ADULT - ASSESSMENT
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    - ERAS protocol  - q4h flap checks  - pain control as needed

## 2024-05-19 NOTE — PROGRESS NOTE ADULT - SUBJECTIVE AND OBJECTIVE BOX
Plastic Surgery Progress Note (pg [facility]: [id number], NS: [phone])    SUBJECTIVE  NAEO. AVSS. Pt comfortable in bed. Pain well controlled, no complaints.    OBJECTIVE  ___________________________________________________  VITAL SIGNS / I&O's   Vital Signs Last 24 Hrs  T(C): 37 (19 May 2024 09:54), Max: 37 (19 May 2024 00:00)  T(F): 98.6 (19 May 2024 09:54), Max: 98.6 (19 May 2024 00:00)  HR: 86 (19 May 2024 09:54) (68 - 100)  BP: 123/77 (19 May 2024 09:54) (99/58 - 134/80)  BP(mean): 87 (19 May 2024 08:00) (72 - 96)  RR: 17 (19 May 2024 09:54) (12 - 22)  SpO2: 100% (19 May 2024 09:54) (97% - 100%)    Parameters below as of 19 May 2024 09:54  Patient On (Oxygen Delivery Method): tracheostomy collar          18 May 2024 07:01  -  19 May 2024 07:00  --------------------------------------------------------  IN:    Enteral Tube Flush: 340 mL    Pivot 1.5: 989 mL  Total IN: 1329 mL    OUT:    Bulb (mL): 9 mL    Bulb (mL): 73.5 mL    VAC (Vacuum Assisted Closure) System (mL): 50 mL  Total OUT: 132.5 mL    Total NET: 1196.5 mL      19 May 2024 07:01  -  19 May 2024 10:09  --------------------------------------------------------  IN:    Pivot 1.5: 43 mL  Total IN: 43 mL    OUT:  Total OUT: 0 mL    Total NET: 43 mL        ___________________________________________________  PHYSICAL EXAM  -- CONSTITUTIONAL: NAD, lying in bed  -- NEURO: Awake, alert  -- HEENT:  intraoral flap appropriate color, no congestion  neck soft, no collections, slightly increased post-operative edema  incision cdi  cook doppler +   JPx2 ss  LUE: vac in place holding suction    ___________________________________________________  LABS                        10.0   9.61  )-----------( 246      ( 19 May 2024 00:30 )             31.5     19 May 2024 01:36    142    |  109    |  14     ----------------------------<  133    4.0     |  22     |  0.65     Ca    8.5        19 May 2024 01:36  Phos  3.0       19 May 2024 01:36  Mg     2.10      19 May 2024 01:36        CAPILLARY BLOOD GLUCOSE            Urinalysis Basic - ( 19 May 2024 01:36 )    Color: x / Appearance: x / SG: x / pH: x  Gluc: 133 mg/dL / Ketone: x  / Bili: x / Urobili: x   Blood: x / Protein: x / Nitrite: x   Leuk Esterase: x / RBC: x / WBC x   Sq Epi: x / Non Sq Epi: x / Bacteria: x      ___________________________________________________  MICRO  Recent Cultures:    ___________________________________________________  MEDICATIONS  (STANDING):  acetaminophen   Oral Liquid .. 1000 milliGRAM(s) Enteral Tube every 6 hours  chlorhexidine 0.12% Liquid 15 milliLiter(s) Swish and Spit two times a day  enoxaparin Injectable 40 milliGRAM(s) SubCutaneous every 24 hours  gabapentin Solution 600 milliGRAM(s) Oral daily  pantoprazole  Injectable 40 milliGRAM(s) IV Push daily  polyethylene glycol 3350 17 Gram(s) Oral daily  senna Syrup 10 milliLiter(s) Oral daily    MEDICATIONS  (PRN):  ondansetron Injectable 4 milliGRAM(s) IV Push every 8 hours PRN Nausea and/or Vomiting  oxyCODONE    Solution 10 milliGRAM(s) Enteral Tube every 6 hours PRN Severe Pain (7 - 10)  oxyCODONE    Solution 5 milliGRAM(s) Enteral Tube every 6 hours PRN Moderate Pain (4 - 6)

## 2024-05-19 NOTE — PROGRESS NOTE ADULT - ASSESSMENT
38yMale with newly diagnosed L buccal invasive SCC s/p L buccal composite rxn, SND, trach, L RFFF, L STSG 5/16, recovering well in SICU.    - ERAS protocol  - may advance to q4h checks with cook and exam  - cw vac to LUE  - pain control as needed  - keep >    Tracie Knowles MD  Plastic and Reconstructive Surgery, PGY-1  [facility]: [id number]  NS: [phone]

## 2024-05-19 NOTE — PROGRESS NOTE ADULT - SUBJECTIVE AND OBJECTIVE BOX
Interval Events:  -Q4 flap checks   -Listed    NEURO  RASS (if intubated): 		CAM ICU (if concern for delirium):  Exam: AO X3  Meds: acetaminophen   Oral Liquid .. 1000 milliGRAM(s) Enteral Tube every 6 hours  gabapentin Solution 600 milliGRAM(s) Oral daily  ondansetron Injectable 4 milliGRAM(s) IV Push every 8 hours PRN Nausea and/or Vomiting  oxyCODONE    Solution 10 milliGRAM(s) Enteral Tube every 6 hours PRN Severe Pain (7 - 10)  oxyCODONE    Solution 5 milliGRAM(s) Enteral Tube every 6 hours PRN Moderate Pain (4 - 6)      RESPIRATORY  RR: 19 (05-19-24 @ 00:00) (10 - 24)  SpO2: 100% (05-19-24 @ 00:00) (97% - 100%)  Wt(kg): --  Exam: satting well on trach collar with profuse foaming secretions  Mechanical Ventilation:     Meds:     CARDIOVASCULAR  HR: 85 (05-19-24 @ 00:00) (68 - 104)  BP: 134/80 (05-19-24 @ 00:00) (99/58 - 134/80)  BP(mean): 96 (05-19-24 @ 00:00) (72 - 96)  ABP: --  ABP(mean): --  Wt(kg): --  CVP(cm H2O): --      Exam: pulse regularly present  Meds:     GI/NUTRITION  Exam: soft, nondistended, nontender  Diet: NPO with tube feeds via jj tube  Meds: pantoprazole  Injectable 40 milliGRAM(s) IV Push daily  polyethylene glycol 3350 17 Gram(s) Oral daily  senna Syrup 10 milliLiter(s) Oral daily      GENITOURINARY  I&O's Detail    05-17 @ 07:01  -  05-18 @ 07:00  --------------------------------------------------------  IN:    IV PiggyBack: 350 mL    Lactated Ringers: 600 mL    Pivot 1.5: 788 mL  Total IN: 1738 mL    OUT:    Bulb (mL): 20 mL    Bulb (mL): 75 mL    Indwelling Catheter - Urethral (mL): 250 mL    VAC (Vacuum Assisted Closure) System (mL): 0 mL    Voided (mL): 750 mL  Total OUT: 1095 mL    Total NET: 643 mL      05-18 @ 07:01  -  05-19 @ 00:09  --------------------------------------------------------  IN:    Enteral Tube Flush: 280 mL    Pivot 1.5: 731 mL  Total IN: 1011 mL    OUT:    Bulb (mL): 8 mL    Bulb (mL): 63.5 mL    VAC (Vacuum Assisted Closure) System (mL): 50 mL  Total OUT: 121.5 mL    Total NET: 889.5 mL          05-18    143  |  108<H>  |  13  ----------------------------<  136<H>  3.3<L>   |  24  |  0.70    Ca    8.1<L>      18 May 2024 01:24  Phos  2.0     05-18  Mg     2.40     05-18      Meds:     HEMATOLOGIC  Meds: enoxaparin Injectable 40 milliGRAM(s) SubCutaneous every 24 hours                          10.4   9.23  )-----------( 216      ( 18 May 2024 01:24 )             31.9         INFECTIOUS DISEASES  T(C): 37 (05-19-24 @ 00:00), Max: 37.6 (05-18-24 @ 08:00)  Wt(kg): --  WBC Count: 9.23 K/uL (05-18 @ 01:24)    Recent Cultures:    Meds:     ENDOCRINE  Capillary Blood Glucose    Meds:     OTHER MEDICATIONS:  chlorhexidine 0.12% Liquid 15 milliLiter(s) Swish and Spit two times a day  chlorhexidine 2% Cloths 1 Application(s) Topical daily      IMAGING:

## 2024-05-20 LAB
ALBUMIN SERPL ELPH-MCNC: 3.6 G/DL — SIGNIFICANT CHANGE UP (ref 3.3–5)
ALP SERPL-CCNC: 132 U/L — HIGH (ref 40–120)
ALT FLD-CCNC: 79 U/L — HIGH (ref 4–41)
ANION GAP SERPL CALC-SCNC: 13 MMOL/L — SIGNIFICANT CHANGE UP (ref 7–14)
ANION GAP SERPL CALC-SCNC: 14 MMOL/L — SIGNIFICANT CHANGE UP (ref 7–14)
APPEARANCE UR: CLEAR — SIGNIFICANT CHANGE UP
AST SERPL-CCNC: 52 U/L — HIGH (ref 4–40)
BASE EXCESS BLDA CALC-SCNC: -0.7 MMOL/L — SIGNIFICANT CHANGE UP (ref -2–3)
BILIRUB SERPL-MCNC: 0.5 MG/DL — SIGNIFICANT CHANGE UP (ref 0.2–1.2)
BILIRUB UR-MCNC: NEGATIVE — SIGNIFICANT CHANGE UP
BLD GP AB SCN SERPL QL: NEGATIVE — SIGNIFICANT CHANGE UP
BUN SERPL-MCNC: 15 MG/DL — SIGNIFICANT CHANGE UP (ref 7–23)
BUN SERPL-MCNC: 16 MG/DL — SIGNIFICANT CHANGE UP (ref 7–23)
CALCIUM SERPL-MCNC: 9.2 MG/DL — SIGNIFICANT CHANGE UP (ref 8.4–10.5)
CALCIUM SERPL-MCNC: 9.4 MG/DL — SIGNIFICANT CHANGE UP (ref 8.4–10.5)
CHLORIDE SERPL-SCNC: 103 MMOL/L — SIGNIFICANT CHANGE UP (ref 98–107)
CHLORIDE SERPL-SCNC: 104 MMOL/L — SIGNIFICANT CHANGE UP (ref 98–107)
CO2 BLDA-SCNC: 25 MMOL/L — HIGH (ref 19–24)
CO2 SERPL-SCNC: 20 MMOL/L — LOW (ref 22–31)
CO2 SERPL-SCNC: 22 MMOL/L — SIGNIFICANT CHANGE UP (ref 22–31)
COLOR SPEC: YELLOW — SIGNIFICANT CHANGE UP
CREAT SERPL-MCNC: 0.54 MG/DL — SIGNIFICANT CHANGE UP (ref 0.5–1.3)
CREAT SERPL-MCNC: 0.64 MG/DL — SIGNIFICANT CHANGE UP (ref 0.5–1.3)
DIFF PNL FLD: NEGATIVE — SIGNIFICANT CHANGE UP
EGFR: 124 ML/MIN/1.73M2 — SIGNIFICANT CHANGE UP
EGFR: 130 ML/MIN/1.73M2 — SIGNIFICANT CHANGE UP
FLUAV AG NPH QL: SIGNIFICANT CHANGE UP
FLUBV AG NPH QL: SIGNIFICANT CHANGE UP
GLUCOSE BLDC GLUCOMTR-MCNC: 106 MG/DL — HIGH (ref 70–99)
GLUCOSE SERPL-MCNC: 106 MG/DL — HIGH (ref 70–99)
GLUCOSE SERPL-MCNC: 111 MG/DL — HIGH (ref 70–99)
GLUCOSE UR QL: NEGATIVE MG/DL — SIGNIFICANT CHANGE UP
GRAM STN FLD: ABNORMAL
HCO3 BLDA-SCNC: 24 MMOL/L — SIGNIFICANT CHANGE UP (ref 21–28)
HCT VFR BLD CALC: 33.7 % — LOW (ref 39–50)
HCT VFR BLD CALC: 34.4 % — LOW (ref 39–50)
HGB BLD-MCNC: 11.2 G/DL — LOW (ref 13–17)
HGB BLD-MCNC: 11.2 G/DL — LOW (ref 13–17)
HOROWITZ INDEX BLDA+IHG-RTO: 100 — SIGNIFICANT CHANGE UP
KETONES UR-MCNC: NEGATIVE MG/DL — SIGNIFICANT CHANGE UP
LEUKOCYTE ESTERASE UR-ACNC: NEGATIVE — SIGNIFICANT CHANGE UP
MAGNESIUM SERPL-MCNC: 2.1 MG/DL — SIGNIFICANT CHANGE UP (ref 1.6–2.6)
MAGNESIUM SERPL-MCNC: 2.3 MG/DL — SIGNIFICANT CHANGE UP (ref 1.6–2.6)
MCHC RBC-ENTMCNC: 26.6 PG — LOW (ref 27–34)
MCHC RBC-ENTMCNC: 27.3 PG — SIGNIFICANT CHANGE UP (ref 27–34)
MCHC RBC-ENTMCNC: 32.6 GM/DL — SIGNIFICANT CHANGE UP (ref 32–36)
MCHC RBC-ENTMCNC: 33.2 GM/DL — SIGNIFICANT CHANGE UP (ref 32–36)
MCV RBC AUTO: 81.7 FL — SIGNIFICANT CHANGE UP (ref 80–100)
MCV RBC AUTO: 82.2 FL — SIGNIFICANT CHANGE UP (ref 80–100)
NITRITE UR-MCNC: NEGATIVE — SIGNIFICANT CHANGE UP
NRBC # BLD: 0 /100 WBCS — SIGNIFICANT CHANGE UP (ref 0–0)
NRBC # BLD: 0 /100 WBCS — SIGNIFICANT CHANGE UP (ref 0–0)
NRBC # FLD: 0 K/UL — SIGNIFICANT CHANGE UP (ref 0–0)
NRBC # FLD: 0 K/UL — SIGNIFICANT CHANGE UP (ref 0–0)
PCO2 BLDA: 40 MMHG — SIGNIFICANT CHANGE UP (ref 35–48)
PH BLDA: 7.39 — SIGNIFICANT CHANGE UP (ref 7.35–7.45)
PH UR: 5.5 — SIGNIFICANT CHANGE UP (ref 5–8)
PHOSPHATE SERPL-MCNC: 3.7 MG/DL — SIGNIFICANT CHANGE UP (ref 2.5–4.5)
PHOSPHATE SERPL-MCNC: 3.9 MG/DL — SIGNIFICANT CHANGE UP (ref 2.5–4.5)
PLATELET # BLD AUTO: 293 K/UL — SIGNIFICANT CHANGE UP (ref 150–400)
PLATELET # BLD AUTO: 326 K/UL — SIGNIFICANT CHANGE UP (ref 150–400)
PO2 BLDA: 112 MMHG — HIGH (ref 83–108)
POTASSIUM SERPL-MCNC: 4 MMOL/L — SIGNIFICANT CHANGE UP (ref 3.5–5.3)
POTASSIUM SERPL-MCNC: 4.2 MMOL/L — SIGNIFICANT CHANGE UP (ref 3.5–5.3)
POTASSIUM SERPL-SCNC: 4 MMOL/L — SIGNIFICANT CHANGE UP (ref 3.5–5.3)
POTASSIUM SERPL-SCNC: 4.2 MMOL/L — SIGNIFICANT CHANGE UP (ref 3.5–5.3)
PROT SERPL-MCNC: 7.5 G/DL — SIGNIFICANT CHANGE UP (ref 6–8.3)
PROT UR-MCNC: SIGNIFICANT CHANGE UP MG/DL
RBC # BLD: 4.1 M/UL — LOW (ref 4.2–5.8)
RBC # BLD: 4.21 M/UL — SIGNIFICANT CHANGE UP (ref 4.2–5.8)
RBC # FLD: 11.9 % — SIGNIFICANT CHANGE UP (ref 10.3–14.5)
RBC # FLD: 12 % — SIGNIFICANT CHANGE UP (ref 10.3–14.5)
RH IG SCN BLD-IMP: POSITIVE — SIGNIFICANT CHANGE UP
RSV RNA NPH QL NAA+NON-PROBE: SIGNIFICANT CHANGE UP
SAO2 % BLDA: 98.8 % — HIGH (ref 94–98)
SARS-COV-2 RNA SPEC QL NAA+PROBE: SIGNIFICANT CHANGE UP
SODIUM SERPL-SCNC: 138 MMOL/L — SIGNIFICANT CHANGE UP (ref 135–145)
SODIUM SERPL-SCNC: 138 MMOL/L — SIGNIFICANT CHANGE UP (ref 135–145)
SP GR SPEC: 1.04 — HIGH (ref 1–1.03)
SPECIMEN SOURCE: SIGNIFICANT CHANGE UP
UROBILINOGEN FLD QL: 2 MG/DL (ref 0.2–1)
WBC # BLD: 10.38 K/UL — SIGNIFICANT CHANGE UP (ref 3.8–10.5)
WBC # BLD: 12.78 K/UL — HIGH (ref 3.8–10.5)
WBC # FLD AUTO: 10.38 K/UL — SIGNIFICANT CHANGE UP (ref 3.8–10.5)
WBC # FLD AUTO: 12.78 K/UL — HIGH (ref 3.8–10.5)

## 2024-05-20 PROCEDURE — 71045 X-RAY EXAM CHEST 1 VIEW: CPT | Mod: 26

## 2024-05-20 PROCEDURE — 93010 ELECTROCARDIOGRAM REPORT: CPT

## 2024-05-20 PROCEDURE — 99233 SBSQ HOSP IP/OBS HIGH 50: CPT

## 2024-05-20 RX ORDER — PIPERACILLIN AND TAZOBACTAM 4; .5 G/20ML; G/20ML
3.38 INJECTION, POWDER, LYOPHILIZED, FOR SOLUTION INTRAVENOUS ONCE
Refills: 0 | Status: DISCONTINUED | OUTPATIENT
Start: 2024-05-20 | End: 2024-05-20

## 2024-05-20 RX ORDER — ACETAMINOPHEN 500 MG
1000 TABLET ORAL ONCE
Refills: 0 | Status: COMPLETED | OUTPATIENT
Start: 2024-05-20 | End: 2024-05-20

## 2024-05-20 RX ORDER — PIPERACILLIN AND TAZOBACTAM 4; .5 G/20ML; G/20ML
3.38 INJECTION, POWDER, LYOPHILIZED, FOR SOLUTION INTRAVENOUS ONCE
Refills: 0 | Status: COMPLETED | OUTPATIENT
Start: 2024-05-20 | End: 2024-05-20

## 2024-05-20 RX ORDER — PIPERACILLIN AND TAZOBACTAM 4; .5 G/20ML; G/20ML
3.38 INJECTION, POWDER, LYOPHILIZED, FOR SOLUTION INTRAVENOUS EVERY 6 HOURS
Refills: 0 | Status: DISCONTINUED | OUTPATIENT
Start: 2024-05-20 | End: 2024-05-20

## 2024-05-20 RX ORDER — PIPERACILLIN AND TAZOBACTAM 4; .5 G/20ML; G/20ML
3.38 INJECTION, POWDER, LYOPHILIZED, FOR SOLUTION INTRAVENOUS EVERY 8 HOURS
Refills: 0 | Status: DISCONTINUED | OUTPATIENT
Start: 2024-05-20 | End: 2024-05-21

## 2024-05-20 RX ADMIN — Medication 1000 MILLIGRAM(S): at 09:00

## 2024-05-20 RX ADMIN — CHLORHEXIDINE GLUCONATE 15 MILLILITER(S): 213 SOLUTION TOPICAL at 05:05

## 2024-05-20 RX ADMIN — ENOXAPARIN SODIUM 40 MILLIGRAM(S): 100 INJECTION SUBCUTANEOUS at 02:44

## 2024-05-20 RX ADMIN — Medication 1000 MILLIGRAM(S): at 22:09

## 2024-05-20 RX ADMIN — Medication 1000 MILLIGRAM(S): at 16:00

## 2024-05-20 RX ADMIN — OXYCODONE HYDROCHLORIDE 10 MILLIGRAM(S): 5 TABLET ORAL at 03:14

## 2024-05-20 RX ADMIN — PIPERACILLIN AND TAZOBACTAM 25 GRAM(S): 4; .5 INJECTION, POWDER, LYOPHILIZED, FOR SOLUTION INTRAVENOUS at 22:09

## 2024-05-20 RX ADMIN — CHLORHEXIDINE GLUCONATE 15 MILLILITER(S): 213 SOLUTION TOPICAL at 18:56

## 2024-05-20 RX ADMIN — PIPERACILLIN AND TAZOBACTAM 25 GRAM(S): 4; .5 INJECTION, POWDER, LYOPHILIZED, FOR SOLUTION INTRAVENOUS at 07:03

## 2024-05-20 RX ADMIN — GABAPENTIN 600 MILLIGRAM(S): 400 CAPSULE ORAL at 11:23

## 2024-05-20 RX ADMIN — PIPERACILLIN AND TAZOBACTAM 200 GRAM(S): 4; .5 INJECTION, POWDER, LYOPHILIZED, FOR SOLUTION INTRAVENOUS at 03:44

## 2024-05-20 RX ADMIN — PANTOPRAZOLE SODIUM 40 MILLIGRAM(S): 20 TABLET, DELAYED RELEASE ORAL at 11:24

## 2024-05-20 RX ADMIN — OXYCODONE HYDROCHLORIDE 10 MILLIGRAM(S): 5 TABLET ORAL at 02:44

## 2024-05-20 RX ADMIN — Medication 1000 MILLIGRAM(S): at 15:30

## 2024-05-20 RX ADMIN — PIPERACILLIN AND TAZOBACTAM 25 GRAM(S): 4; .5 INJECTION, POWDER, LYOPHILIZED, FOR SOLUTION INTRAVENOUS at 13:30

## 2024-05-20 RX ADMIN — Medication 1000 MILLIGRAM(S): at 09:30

## 2024-05-20 RX ADMIN — Medication 400 MILLIGRAM(S): at 03:44

## 2024-05-20 NOTE — PROGRESS NOTE ADULT - ASSESSMENT
38yMale with newly diagnosed L buccal invasive SCC s/p L buccal composite rxn, SND, trach, L RFFF, L STSG 5/16, recovering well in SICU.    - ERAS protocol  - q4h checks with cook and exam  - cw vac to LUE  - pain control as needed  - keep >    Padilla Mujica MD  Plastic and Reconstructive Surgery, PGY-1  [facility]: [id number]  NS: [phone]

## 2024-05-20 NOTE — PROGRESS NOTE ADULT - SUBJECTIVE AND OBJECTIVE BOX
[facility] Division of Hospital Medicine  Nithin Salinas MD  Pager [phone]    Patient is a 39y old  Male who presents with a chief complaint of Localized swelling, mass, and lump of head.  Per H&P chart review, Patient is a 38y Male with no significant PMH who presented to an OSH for complaints of L sided jaw and face pain 5/9/24, was found to have L buccal invasive SCC. He is s/p L buccal composite resection, trach, L RFFF and L STSG on 5/16/24.  He has recovered in SICU and was transferred to surgical floor 5/19/24      SUBJECTIVE / OVERNIGHT EVENTS: RRT was called early AM 5/20/24 for hypoxia and fever.  The patient was stabilized with oxygen and had a bedside scope by ENT that did not show obstructive mucous plugging.  Blood cultures were sent and zosyn was started.  Today the patient is comfortable and denies complaints.      MEDICATIONS  (STANDING):  acetaminophen   Oral Liquid .. 1000 milliGRAM(s) Enteral Tube every 6 hours  chlorhexidine 0.12% Liquid 15 milliLiter(s) Swish and Spit two times a day  enoxaparin Injectable 40 milliGRAM(s) SubCutaneous every 24 hours  gabapentin Solution 600 milliGRAM(s) Oral daily  pantoprazole  Injectable 40 milliGRAM(s) IV Push daily  piperacillin/tazobactam IVPB.. 3.375 Gram(s) IV Intermittent every 8 hours  polyethylene glycol 3350 17 Gram(s) Oral daily  senna Syrup 10 milliLiter(s) Oral daily    MEDICATIONS  (PRN):  ondansetron Injectable 4 milliGRAM(s) IV Push every 8 hours PRN Nausea and/or Vomiting  oxyCODONE    Solution 10 milliGRAM(s) Enteral Tube every 6 hours PRN Severe Pain (7 - 10)  oxyCODONE    Solution 5 milliGRAM(s) Enteral Tube every 6 hours PRN Moderate Pain (4 - 6)      CAPILLARY BLOOD GLUCOSE      POCT Blood Glucose.: 106 mg/dL (20 May 2024 03:02)    I&O's Summary    19 May 2024 07:01  -  20 May 2024 07:00  --------------------------------------------------------  IN: 1507 mL / OUT: 4078.5 mL / NET: -2571.5 mL    20 May 2024 07:01  -  20 May 2024 12:04  --------------------------------------------------------  IN: 0 mL / OUT: 5 mL / NET: -5 mL        PHYSICAL EXAM:  Vital Signs Last 24 Hrs  T(C): 36.4 (20 May 2024 09:45), Max: 38.8 (20 May 2024 03:05)  T(F): 97.5 (20 May 2024 09:45), Max: 101.9 (20 May 2024 03:05)  HR: 98 (20 May 2024 09:45) (92 - 127)  BP: 111/65 (20 May 2024 09:45) (103/68 - 139/83)  BP(mean): --  RR: 18 (20 May 2024 09:45) (17 - 18)  SpO2: 99% (20 May 2024 09:45) (95% - 100%)    Parameters below as of 20 May 2024 09:45  Patient On (Oxygen Delivery Method): tracheostomy collar      CONSTITUTIONAL: NAD  EYES: EOMI, conjunctiva and sclera clear  ENMT: Moist oral mucosa  NECK: radial free flap, surgical incisions healing, trach in place  RESPIRATORY: Breathing unlabored, CTAB  CARDIOVASCULAR: S1S2 no MRG  ABDOMEN: Nontender to palpation, normoactive bowel sounds, no rebound/guarding  MUSCULOSKELETAL: no clubbing or cyanosis of digits  NEUROLOGY: No focal deficits   SKIN: No rashes or lesions    LABS:                        11.2   10.38 )-----------( 293      ( 20 May 2024 06:40 )             33.7     05-20    138  |  103  |  16  ----------------------------<  106<H>  4.0   |  22  |  0.64    Ca    9.2      20 May 2024 06:40  Phos  3.9     05-20  Mg     2.30     05-20    TPro  7.5  /  Alb  3.6  /  TBili  0.5  /  DBili  x   /  AST  52<H>  /  ALT  79<H>  /  AlkPhos  132<H>  05-20          Urinalysis Basic - ( 20 May 2024 06:40 )    Color: x / Appearance: x / SG: x / pH: x  Gluc: 106 mg/dL / Ketone: x  / Bili: x / Urobili: x   Blood: x / Protein: x / Nitrite: x   Leuk Esterase: x / RBC: x / WBC x   Sq Epi: x / Non Sq Epi: x / Bacteria: x          RADIOLOGY & ADDITIONAL TESTS:  Results Reviewed:   < from: Xray Chest 1 View-PORTABLE IMMEDIATE (Xray Chest 1 View-PORTABLE IMMEDIATE .) (05.20.24 @ 03:38) >    ACC: [id number] EXAM:  XR CHEST PORTABLE IMMED 1V   ORDERED BY: JT CAIN     PROCEDURE DATE:  05/20/2024          INTERPRETATION:  EXAMINATION: XR CHEST IMMEDIATE    CLINICAL INDICATION: Shortness of Breath    TECHNIQUE: Single frontal, portable view of the chest was obtained.    COMPARISON: Chest x-ray 5/17/2024.    FINDINGS:  Tracheostomy tube with tip at level of thoracic inlet. Enteric tube   courses below diaphragm, with tip in stomach.  Cardiopericardial silhouette is normal in size.  Decreased lung volumes. No focal consolidations.  There is no pneumothorax or pleural effusion.  No acute bony abnormality.    IMPRESSION:  Decreased lung volumes. No focal consolidations.    --- End of Report ---          BAYRON HOLGUIN MD; Resident Radiologist  This document has been electronically signed.  YESSICA REESE MD; Attending Radiologist  This document has been electronically signed. May 20 2024  8:00AM    < end of copied text >      COORDINATION OF CARE:  Care Discussed with Consultants/Other Providers [Y/N]: ENT

## 2024-05-20 NOTE — CHART NOTE - NSCHARTNOTEFT_GEN_A_CORE
Risks and benefits discussed with pt  Verbal consent obtained prior to procedure. RN notified prior to procedure  Suction setup at bedside with suction catheter attached. Ambu-bag available at bedside with 100% FiO2  Patient places on supine position with neck slightly extended, area around the trach is cleaned with Chloraprep, waited 2 minutes for air dry. 2cc of Lidocaine 1% were injected around the suture site. Sutures secured the tracheostomy tube cut using scissor, tracheostomy tube Shiley [id number] cuff is found to be deflated prior to procedure. A 10 CC syringe used to completely removed any remaining air in the trach cuff. Tracheostomy tube Shiley [id number] removed from the stoma, and tracheostomy tube Shiley [id number] inserted with obturator, then inner cannular replaced obturator. Tracheostomy tube is secured with 4 sutures using 2.0 silk. Patient suctioned, no sign of distress. Patient tolerated the procedure well without complication.    Placed patient on , spoke with team to keep the cap on the patient as tolerated, only remove if show sign of respiratory distress

## 2024-05-20 NOTE — PROGRESS NOTE ADULT - SUBJECTIVE AND OBJECTIVE BOX
OTOLARYNGOLOGY (ENT) PROGRESS NOTE    PATIENT: BERNA BENDER  MRN: [id number]  : [birth year]  [id number]  DATE OF SERVICE:  24  	  Subjective/ Interval:   Rapid response called overnight due to desats to the 80s. Pt self-recovered. Fever workup sent.    Physical Exam:  Alert, NAD  Nonlabored Respirations RA, no stridor or stertor   OC/OP: left buccal flap with good color and turgor  Neck: soft/flat, incision c/d/i, 6CN trach in place   [employer] signal strong  Neck JPs w/ SS drainage  L arm in ace wrap         LABS:                        11.2   12.78 )-----------( 326      ( 20 May 2024 03:30 )             34.4     05-    138  |  104  |  15  ----------------------------<  111<H>  4.2   |  20<L>  |  0.54    Ca    9.4      20 May 2024 03:30  Phos  3.7     05-  Mg     2.10         TPro  7.5  /  Alb  3.6  /  TBili  0.5  /  DBili  x   /  AST  52<H>  /  ALT  79<H>  /  AlkPhos  132<H>

## 2024-05-20 NOTE — PROGRESS NOTE ADULT - SUBJECTIVE AND OBJECTIVE BOX
Plastic Surgery Progress Note (pg [facility]: [id number], NS: [phone])    SUBJECTIVE  The patient was seen and examined. Rapid called overnight - Tachycardic, Hypoxic, febrile. Spontaneously recovered. Flap continues to be well perfused    OBJECTIVE  ___________________________________________________  VITAL SIGNS / I&O's   Vital Signs Last 24 Hrs  T(C): 36.5 (20 May 2024 07:00), Max: 38.8 (20 May 2024 03:05)  T(F): 97.7 (20 May 2024 07:00), Max: 101.9 (20 May 2024 03:05)  HR: 96 (20 May 2024 07:00) (86 - 127)  BP: 103/68 (20 May 2024 07:00) (103/68 - 139/83)  BP(mean): 87 (19 May 2024 08:00) (87 - 87)  RR: 18 (20 May 2024 07:00) (17 - 18)  SpO2: 97% (20 May 2024 07:00) (95% - 100%)    Parameters below as of 20 May 2024 07:00  Patient On (Oxygen Delivery Method): tracheostomy collar  O2 Flow (L/min): 6  O2 Concentration (%): 28      19 May 2024 07:01  -  20 May 2024 07:00  --------------------------------------------------------  IN:    Enteral Tube Flush: 340 mL    IV PiggyBack: 350 mL    Pivot 1.5: 817 mL  Total IN: 1507 mL    OUT:    Bulb (mL): 8.5 mL    Bulb (mL): 15 mL    VAC (Vacuum Assisted Closure) System (mL): 5 mL    Voided (mL): 4050 mL  Total OUT: 4078.5 mL    Total NET: -2571.5 mL        ___________________________________________________  PHYSICAL EXAM    -- CONSTITUTIONAL: NAD, lying in bed  -- NEURO: Awake, alert  -- HEENT:  intraoral flap appropriate color, no congestion  neck soft, no collections, slightly increased post-operative edema from previous exams  incision cdi  cook doppler +   JPx2 ss  LUE: vac in place holding suction    ___________________________________________________  LABS                        11.2   12.78 )-----------( 326      ( 20 May 2024 03:30 )             34.4     20 May 2024 03:30    138    |  104    |  15     ----------------------------<  111    4.2     |  20     |  0.54     Ca    9.4        20 May 2024 03:30  Phos  3.7       20 May 2024 03:30  Mg     2.10      20 May 2024 03:30    TPro  7.5    /  Alb  3.6    /  TBili  0.5    /  DBili  x      /  AST  52     /  ALT  79     /  AlkPhos  132    20 May 2024 03:30      CAPILLARY BLOOD GLUCOSE      POCT Blood Glucose.: 106 mg/dL (20 May 2024 03:02)        Urinalysis Basic - ( 20 May 2024 03:30 )    Color: x / Appearance: x / SG: x / pH: x  Gluc: 111 mg/dL / Ketone: x  / Bili: x / Urobili: x   Blood: x / Protein: x / Nitrite: x   Leuk Esterase: x / RBC: x / WBC x   Sq Epi: x / Non Sq Epi: x / Bacteria: x      ___________________________________________________  MICRO  Recent Cultures:    ___________________________________________________  MEDICATIONS  (STANDING):  acetaminophen   Oral Liquid .. 1000 milliGRAM(s) Enteral Tube every 6 hours  chlorhexidine 0.12% Liquid 15 milliLiter(s) Swish and Spit two times a day  enoxaparin Injectable 40 milliGRAM(s) SubCutaneous every 24 hours  gabapentin Solution 600 milliGRAM(s) Oral daily  pantoprazole  Injectable 40 milliGRAM(s) IV Push daily  piperacillin/tazobactam IVPB.. 3.375 Gram(s) IV Intermittent every 8 hours  polyethylene glycol 3350 17 Gram(s) Oral daily  senna Syrup 10 milliLiter(s) Oral daily    MEDICATIONS  (PRN):  ondansetron Injectable 4 milliGRAM(s) IV Push every 8 hours PRN Nausea and/or Vomiting  oxyCODONE    Solution 10 milliGRAM(s) Enteral Tube every 6 hours PRN Severe Pain (7 - 10)  oxyCODONE    Solution 5 milliGRAM(s) Enteral Tube every 6 hours PRN Moderate Pain (4 - 6)

## 2024-05-20 NOTE — RAPID RESPONSE TEAM SUMMARY - NSSITUATIONBACKGROUNDRRT_GEN_ALL_CORE
38yM with newly diagnosed L buccal invasive SCC s/p L buccal composite rxn, SND, trach, L RFFF, L STSG 5/16, recovering well in SICU and was downgraded to the surgical floor in the morning of 5/19. Today on 5/20, patient developed SOB and became hypoxic to the 80s on 6L nasal cannula, thus RRT was called.    Upon evaluation, patient was awake and alert. Temp. was 101.9, HR 120s, BP 130s/50s, and O2 Sat 100% on facemask. EKG revelead sinus tachycardia, and CXR revealed suggestion of LLQ consolidation/atelectasis. STAT cbc, bmp, abg, lactate, and blood cultures were drawn. Bedside bronchoscopy by ENT revealed no evidence of mucus plugging or severe secretions in the lungs. Patient was given tylenol and zosyn was started. Given patient's hemodynamic stability at this time, decision was made to conclude the rapid. Primary ENT service will continue to monitor patient and decide if further diagnostic/theraputic measures are warranted.

## 2024-05-20 NOTE — PROGRESS NOTE ADULT - ASSESSMENT
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    - ERAS protocol  - q4h flap checks  - IV zosyn  - Fu fever workup  - pain control as needed

## 2024-05-20 NOTE — PROGRESS NOTE ADULT - ASSESSMENT
38M with no significant PMH presented with L sided jaw and face pain 5/9/24, was found to have L buccal invasive SCC, s/p L buccal composite resection, trach, L RFFF and L STSG on 5/16/24, transferred to surgical floor 5/19/24, with fever and hypoxia this morning    Plan:  Sepsis: Meets SIRS criteria with fever and trachycardia.  Agree cover with broad spectrum antibiotics for now, monitor fever curve, f/u cultures.  CXR without consolidation, but at risk for aspiration pneumonia.  Obtain sputum culture.    Transaminitis: Mild, asymptomatic, would monitor LFTs, may be due to surgical stressor or sepsis.    SCC: Postoperative management per ENT and plastic surgery.  Lovenox VTE ppx.

## 2024-05-21 LAB
ALBUMIN SERPL ELPH-MCNC: 3.8 G/DL — SIGNIFICANT CHANGE UP (ref 3.3–5)
ALP SERPL-CCNC: 163 U/L — HIGH (ref 40–120)
ALT FLD-CCNC: 90 U/L — HIGH (ref 4–41)
ANION GAP SERPL CALC-SCNC: 13 MMOL/L — SIGNIFICANT CHANGE UP (ref 7–14)
AST SERPL-CCNC: 64 U/L — HIGH (ref 4–40)
BILIRUB SERPL-MCNC: 0.6 MG/DL — SIGNIFICANT CHANGE UP (ref 0.2–1.2)
BUN SERPL-MCNC: 17 MG/DL — SIGNIFICANT CHANGE UP (ref 7–23)
CALCIUM SERPL-MCNC: 9.3 MG/DL — SIGNIFICANT CHANGE UP (ref 8.4–10.5)
CHLORIDE SERPL-SCNC: 104 MMOL/L — SIGNIFICANT CHANGE UP (ref 98–107)
CO2 SERPL-SCNC: 22 MMOL/L — SIGNIFICANT CHANGE UP (ref 22–31)
CREAT SERPL-MCNC: 0.6 MG/DL — SIGNIFICANT CHANGE UP (ref 0.5–1.3)
CULTURE RESULTS: ABNORMAL
EGFR: 126 ML/MIN/1.73M2 — SIGNIFICANT CHANGE UP
GLUCOSE SERPL-MCNC: 151 MG/DL — HIGH (ref 70–99)
HCT VFR BLD CALC: 33.9 % — LOW (ref 39–50)
HGB BLD-MCNC: 10.8 G/DL — LOW (ref 13–17)
MAGNESIUM SERPL-MCNC: 2.3 MG/DL — SIGNIFICANT CHANGE UP (ref 1.6–2.6)
MCHC RBC-ENTMCNC: 26.3 PG — LOW (ref 27–34)
MCHC RBC-ENTMCNC: 31.9 GM/DL — LOW (ref 32–36)
MCV RBC AUTO: 82.5 FL — SIGNIFICANT CHANGE UP (ref 80–100)
NRBC # BLD: 0 /100 WBCS — SIGNIFICANT CHANGE UP (ref 0–0)
NRBC # FLD: 0 K/UL — SIGNIFICANT CHANGE UP (ref 0–0)
PHOSPHATE SERPL-MCNC: 2.6 MG/DL — SIGNIFICANT CHANGE UP (ref 2.5–4.5)
PLATELET # BLD AUTO: 337 K/UL — SIGNIFICANT CHANGE UP (ref 150–400)
POTASSIUM SERPL-MCNC: 3.7 MMOL/L — SIGNIFICANT CHANGE UP (ref 3.5–5.3)
POTASSIUM SERPL-SCNC: 3.7 MMOL/L — SIGNIFICANT CHANGE UP (ref 3.5–5.3)
PROT SERPL-MCNC: 7.5 G/DL — SIGNIFICANT CHANGE UP (ref 6–8.3)
RBC # BLD: 4.11 M/UL — LOW (ref 4.2–5.8)
RBC # FLD: 11.9 % — SIGNIFICANT CHANGE UP (ref 10.3–14.5)
SODIUM SERPL-SCNC: 139 MMOL/L — SIGNIFICANT CHANGE UP (ref 135–145)
SPECIMEN SOURCE: SIGNIFICANT CHANGE UP
WBC # BLD: 8.67 K/UL — SIGNIFICANT CHANGE UP (ref 3.8–10.5)
WBC # FLD AUTO: 8.67 K/UL — SIGNIFICANT CHANGE UP (ref 3.8–10.5)

## 2024-05-21 PROCEDURE — 99233 SBSQ HOSP IP/OBS HIGH 50: CPT

## 2024-05-21 PROCEDURE — 76700 US EXAM ABDOM COMPLETE: CPT | Mod: 26

## 2024-05-21 RX ORDER — AMPICILLIN SODIUM AND SULBACTAM SODIUM 250; 125 MG/ML; MG/ML
3 INJECTION, POWDER, FOR SUSPENSION INTRAMUSCULAR; INTRAVENOUS ONCE
Refills: 0 | Status: COMPLETED | OUTPATIENT
Start: 2024-05-21 | End: 2024-05-21

## 2024-05-21 RX ORDER — AMPICILLIN SODIUM AND SULBACTAM SODIUM 250; 125 MG/ML; MG/ML
INJECTION, POWDER, FOR SUSPENSION INTRAMUSCULAR; INTRAVENOUS
Refills: 0 | Status: DISCONTINUED | OUTPATIENT
Start: 2024-05-21 | End: 2024-05-24

## 2024-05-21 RX ORDER — AMPICILLIN SODIUM AND SULBACTAM SODIUM 250; 125 MG/ML; MG/ML
3 INJECTION, POWDER, FOR SUSPENSION INTRAMUSCULAR; INTRAVENOUS EVERY 6 HOURS
Refills: 0 | Status: DISCONTINUED | OUTPATIENT
Start: 2024-05-21 | End: 2024-05-24

## 2024-05-21 RX ORDER — POTASSIUM CHLORIDE 20 MEQ
40 PACKET (EA) ORAL ONCE
Refills: 0 | Status: COMPLETED | OUTPATIENT
Start: 2024-05-21 | End: 2024-05-21

## 2024-05-21 RX ADMIN — CHLORHEXIDINE GLUCONATE 15 MILLILITER(S): 213 SOLUTION TOPICAL at 19:18

## 2024-05-21 RX ADMIN — PIPERACILLIN AND TAZOBACTAM 25 GRAM(S): 4; .5 INJECTION, POWDER, LYOPHILIZED, FOR SOLUTION INTRAVENOUS at 05:01

## 2024-05-21 RX ADMIN — AMPICILLIN SODIUM AND SULBACTAM SODIUM 200 GRAM(S): 250; 125 INJECTION, POWDER, FOR SUSPENSION INTRAMUSCULAR; INTRAVENOUS at 23:56

## 2024-05-21 RX ADMIN — Medication 1000 MILLIGRAM(S): at 15:43

## 2024-05-21 RX ADMIN — Medication 1000 MILLIGRAM(S): at 04:28

## 2024-05-21 RX ADMIN — PANTOPRAZOLE SODIUM 40 MILLIGRAM(S): 20 TABLET, DELAYED RELEASE ORAL at 12:17

## 2024-05-21 RX ADMIN — Medication 1000 MILLIGRAM(S): at 11:07

## 2024-05-21 RX ADMIN — CHLORHEXIDINE GLUCONATE 15 MILLILITER(S): 213 SOLUTION TOPICAL at 05:01

## 2024-05-21 RX ADMIN — Medication 1000 MILLIGRAM(S): at 10:37

## 2024-05-21 RX ADMIN — AMPICILLIN SODIUM AND SULBACTAM SODIUM 200 GRAM(S): 250; 125 INJECTION, POWDER, FOR SUSPENSION INTRAMUSCULAR; INTRAVENOUS at 18:18

## 2024-05-21 RX ADMIN — Medication 1000 MILLIGRAM(S): at 16:13

## 2024-05-21 RX ADMIN — Medication 1000 MILLIGRAM(S): at 23:57

## 2024-05-21 RX ADMIN — ENOXAPARIN SODIUM 40 MILLIGRAM(S): 100 INJECTION SUBCUTANEOUS at 04:28

## 2024-05-21 RX ADMIN — Medication 40 MILLIEQUIVALENT(S): at 08:34

## 2024-05-21 RX ADMIN — AMPICILLIN SODIUM AND SULBACTAM SODIUM 200 GRAM(S): 250; 125 INJECTION, POWDER, FOR SUSPENSION INTRAMUSCULAR; INTRAVENOUS at 10:25

## 2024-05-21 RX ADMIN — GABAPENTIN 600 MILLIGRAM(S): 400 CAPSULE ORAL at 12:17

## 2024-05-21 NOTE — PROGRESS NOTE ADULT - SUBJECTIVE AND OBJECTIVE BOX
[facility] Division of Hospital Medicine  Nithin Salinas MD  Pager [phone]    Patient is a 39y old  Male who presents with a chief complaint of SCC (20 May 2024 11:58)      SUBJECTIVE / OVERNIGHT EVENTS: Feeling better      MEDICATIONS  (STANDING):  acetaminophen   Oral Liquid .. 1000 milliGRAM(s) Enteral Tube every 6 hours  ampicillin/sulbactam  IVPB      ampicillin/sulbactam  IVPB 3 Gram(s) IV Intermittent every 6 hours  chlorhexidine 0.12% Liquid 15 milliLiter(s) Swish and Spit two times a day  enoxaparin Injectable 40 milliGRAM(s) SubCutaneous every 24 hours  gabapentin Solution 600 milliGRAM(s) Oral daily  pantoprazole  Injectable 40 milliGRAM(s) IV Push daily  polyethylene glycol 3350 17 Gram(s) Oral daily  senna Syrup 10 milliLiter(s) Oral daily    MEDICATIONS  (PRN):  ondansetron Injectable 4 milliGRAM(s) IV Push every 8 hours PRN Nausea and/or Vomiting  oxyCODONE    Solution 5 milliGRAM(s) Enteral Tube every 6 hours PRN Moderate Pain (4 - 6)  oxyCODONE    Solution 10 milliGRAM(s) Enteral Tube every 6 hours PRN Severe Pain (7 - 10)      CAPILLARY BLOOD GLUCOSE        I&O's Summary    20 May 2024 07:01  -  21 May 2024 07:00  --------------------------------------------------------  IN: 2240 mL / OUT: 565 mL / NET: 1675 mL    21 May 2024 07:01  -  21 May 2024 14:57  --------------------------------------------------------  IN: 610 mL / OUT: 155 mL / NET: 455 mL        PHYSICAL EXAM:  Vital Signs Last 24 Hrs  T(C): 36.7 (21 May 2024 14:13), Max: 37 (21 May 2024 10:00)  T(F): 98.1 (21 May 2024 14:13), Max: 98.6 (21 May 2024 10:00)  HR: 85 (21 May 2024 14:13) (85 - 101)  BP: 100/61 (21 May 2024 14:13) (100/61 - 111/75)  BP(mean): --  RR: 17 (21 May 2024 14:13) (17 - 18)  SpO2: 96% (21 May 2024 14:13) (96% - 98%)    Parameters below as of 21 May 2024 14:13  Patient On (Oxygen Delivery Method): room air      CONSTITUTIONAL: NAD  EYES: EOMI, conjunctiva and sclera clear  ENMT: Moist oral mucosa  NECK: surgical sites CDI  RESPIRATORY: Breathing unlabored, CTAB  CARDIOVASCULAR: S1S2 no MRG  ABDOMEN: Nontender to palpation, normoactive bowel sounds, no rebound/guarding  MUSCULOSKELETAL: no clubbing or cyanosis of digits  NEUROLOGY: No focal deficits   SKIN: No rashes or lesions    LABS:                        10.8   8.67  )-----------( 337      ( 21 May 2024 05:30 )             33.9     05-21    139  |  104  |  17  ----------------------------<  151<H>  3.7   |  22  |  0.60    Ca    9.3      21 May 2024 05:30  Phos  2.6     05-21  Mg     2.30     05-21    TPro  7.5  /  Alb  3.8  /  TBili  0.6  /  DBili  x   /  AST  64<H>  /  ALT  90<H>  /  AlkPhos  163<H>  05-21          Urinalysis Basic - ( 21 May 2024 05:30 )    Color: x / Appearance: x / SG: x / pH: x  Gluc: 151 mg/dL / Ketone: x  / Bili: x / Urobili: x   Blood: x / Protein: x / Nitrite: x   Leuk Esterase: x / RBC: x / WBC x   Sq Epi: x / Non Sq Epi: x / Bacteria: x        Culture - Sputum (collected 20 May 2024 12:43)  Source: Trach Asp Tracheal Aspirate  Gram Stain (20 May 2024 21:48):    Numerous polymorphonuclear leukocytes per low power field    No Squamous epithelial cells per low power field    Rare Gram positive cocci in pairs per oil power field  Preliminary Report (21 May 2024 08:36):    Normal Respiratory Keisha present    Culture - Blood (collected 20 May 2024 03:25)  Source: .Blood Blood  Preliminary Report (21 May 2024 09:02):    No growth at 24 hours    Culture - Blood (collected 20 May 2024 03:15)  Source: .Blood Blood  Preliminary Report (21 May 2024 09:02):    No growth at 24 hours        COORDINATION OF CARE:  Care Discussed with Consultants/Other Providers [Y/N]: ENT

## 2024-05-21 NOTE — PROGRESS NOTE ADULT - ASSESSMENT
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    - ERAS protocol  - q4h flap checks  - Continue capping trial  - IV zosyn  - Fu fever workup  - pain control as needed

## 2024-05-21 NOTE — PROGRESS NOTE ADULT - ASSESSMENT
38M with no significant PMH presented with L sided jaw and face pain 5/9/24, was found to have L buccal invasive SCC, s/p L buccal composite resection, trach, L RFFF and L STSG on 5/16/24, transferred to surgical floor 5/19/24, with fever and hypoxia this morning    Plan:  Sepsis: Met SIRS criteria with fever and tachycardia 5/20.  Agree cover with broad spectrum antibiotics for now, monitor fever curve, f/u cultures.  Leukocytosis and fever (sepsis) has resolved.  CXR without consolidation, but at risk for aspiration pneumonia. Agree downgrade to unasyn and complete a 5 day course abx empiric treatment for aspiration pneumonia if remains stable    Transaminitis: Mild, asymptomatic, would monitor LFTs, may be due to surgical stressor or sepsis.  Abd US unremarkable    SCC: Postoperative management per ENT and plastic surgery.  Lovenox VTE ppx.   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  Agree cover with broad spectrum antibiotics for now, monitor fever curve, f/u cultures.  Leukocytosis and fever (sepsis) has resolved.  CXR without consolidation, but at risk for aspiration pneumonia. Agree downgrade to unasyn and complete a 5 day course abx empiric treatment for aspiration pneumonia if remains stable    Transaminitis: Mild, asymptomatic, would monitor LFTs, may be due to surgical stressor or sepsis.  Abd US unremarkable    SCC: Postoperative management per ENT and plastic surgery.  Lovenox VTE ppx.

## 2024-05-21 NOTE — PROGRESS NOTE ADULT - SUBJECTIVE AND OBJECTIVE BOX
OTOLARYNGOLOGY (ENT) PROGRESS NOTE    PATIENT: BERNA BENDER  MRN: [id number]  : 85  [id number]  DATE OF SERVICE:  24  	  Subjective/ Interval:   Patient doing well with cap ovenright. Fever workup sent. Gram positive cocci trach cultures.    Physical Exam:  Alert, NAD  Nonlabored Respirations RA, no stridor or stertor   OC/OP: left buccal flap with good color and turgor  Neck: soft/flat, incision c/d/i, 6CN trach in place, capped  Cook signal strong  Neck JPs w/ SS drainage  L arm in ace wrap     Vital Signs Last 24 Hrs  T(C): 36.6 (21 May 2024 05:10), Max: 36.7 (20 May 2024 14:00)  T(F): 97.9 (21 May 2024 05:10), Max: 98 (20 May 2024 14:00)  HR: 98 (21 May 2024 05:10) (85 - 101)  BP: 104/73 (21 May 2024 05:10) (102/65 - 111/65)  BP(mean): --  RR: 17 (21 May 2024 05:10) (17 - 18)  SpO2: 98% (21 May 2024 05:10) (96% - 100%)    Parameters below as of 21 May 2024 05:10  Patient On (Oxygen Delivery Method): room air        LABS:                        11.2   10.38 )-----------( 293      ( 20 May 2024 06:40 )             33.7     05-20    138  |  103  |  16  ----------------------------<  106<H>  4.0   |  22  |  0.64    Ca    9.2      20 May 2024 06:40  Phos  3.9     05-20  Mg     2.30     05-20    TPro  7.5  /  Alb  3.6  /  TBili  0.5  /  DBili  x   /  AST  52<H>  /  ALT  79<H>  /  AlkPhos  132<H>  05-20

## 2024-05-21 NOTE — PROGRESS NOTE ADULT - ASSESSMENT
38yMale with newly diagnosed L buccal invasive SCC s/p L buccal composite rxn, SND, trach, L RFFF, L STSG 5/16, recovering well in SICU.    - ERAS protocol  - q4h checks with cook and exam  - cw vac to LUE  - pain control as needed  - keep >    Padilla Mujica MD  Plastic and Reconstructive Surgery, PGY-1  HENRY: [id number]  NS: [phone]

## 2024-05-21 NOTE — CHART NOTE - NSCHARTNOTEFT_GEN_A_CORE
Patient's left neck jair-headley drain removed, no complications, patient tolerated well.  Jair-headley drain stoma covered with guaze pad.

## 2024-05-21 NOTE — PROGRESS NOTE ADULT - SUBJECTIVE AND OBJECTIVE BOX
Plastic Surgery Progress Note (pg [facility]: [id number], NS: [phone])    SUBJECTIVE  The patient was seen and examined.     OBJECTIVE  ___________________________________________________  VITAL SIGNS / I&O's   Vital Signs Last 24 Hrs  T(C): 36.6 (21 May 2024 05:10), Max: 36.7 (20 May 2024 14:00)  T(F): 97.9 (21 May 2024 05:10), Max: 98 (20 May 2024 14:00)  HR: 98 (21 May 2024 05:10) (85 - 101)  BP: 104/73 (21 May 2024 05:10) (102/65 - 111/65)  BP(mean): --  RR: 17 (21 May 2024 05:10) (17 - 18)  SpO2: 98% (21 May 2024 05:10) (96% - 100%)    Parameters below as of 21 May 2024 05:10  Patient On (Oxygen Delivery Method): room air          20 May 2024 07:01  -  21 May 2024 07:00  --------------------------------------------------------  IN:    Enteral Tube Flush: 1000 mL    IV PiggyBack: 200 mL    Jevity 1.5: 1040 mL  Total IN: 2240 mL    OUT:    Bulb (mL): 15 mL    VAC (Vacuum Assisted Closure) System (mL): 0 mL    Voided (mL): 550 mL  Total OUT: 565 mL    Total NET: 1675 mL        ___________________________________________________  PHYSICAL EXAM    -- CONSTITUTIONAL: NAD, lying in bed  -- NEURO: Awake, alert  -- HEENT:  intraoral flap appropriate color, no congestion  neck soft, no collections  incision cdi  cook doppler +   JPx2 ss  LUE: vac in place holding suction, splint in place    ___________________________________________________  LABS                        11.2   10.38 )-----------( 293      ( 20 May 2024 06:40 )             33.7     20 May 2024 06:40    138    |  103    |  16     ----------------------------<  106    4.0     |  22     |  0.64     Ca    9.2        20 May 2024 06:40  Phos  3.9       20 May 2024 06:40  Mg     2.30      20 May 2024 06:40    TPro  7.5    /  Alb  3.6    /  TBili  0.5    /  DBili  x      /  AST  52     /  ALT  79     /  AlkPhos  132    20 May 2024 03:30      CAPILLARY BLOOD GLUCOSE            Urinalysis Basic - ( 20 May 2024 19:25 )    Color: Yellow / Appearance: Clear / S.036 / pH: x  Gluc: x / Ketone: Negative mg/dL  / Bili: Negative / Urobili: 2.0 mg/dL   Blood: x / Protein: Trace mg/dL / Nitrite: Negative   Leuk Esterase: Negative / RBC: x / WBC x   Sq Epi: x / Non Sq Epi: x / Bacteria: x      ___________________________________________________  MICRO  Recent Cultures:  Specimen Source: Trach Asp Tracheal Aspirate,  @ 12:43; Results --; Gram Stain:   Numerous polymorphonuclear leukocytes per low power field  No Squamous epithelial cells per low power field  Rare Gram positive cocci in pairs per oil power field<!>; Organism: --    ___________________________________________________  MEDICATIONS  (STANDING):  acetaminophen   Oral Liquid .. 1000 milliGRAM(s) Enteral Tube every 6 hours  chlorhexidine 0.12% Liquid 15 milliLiter(s) Swish and Spit two times a day  enoxaparin Injectable 40 milliGRAM(s) SubCutaneous every 24 hours  gabapentin Solution 600 milliGRAM(s) Oral daily  pantoprazole  Injectable 40 milliGRAM(s) IV Push daily  piperacillin/tazobactam IVPB.. 3.375 Gram(s) IV Intermittent every 8 hours  polyethylene glycol 3350 17 Gram(s) Oral daily  senna Syrup 10 milliLiter(s) Oral daily    MEDICATIONS  (PRN):  ondansetron Injectable 4 milliGRAM(s) IV Push every 8 hours PRN Nausea and/or Vomiting  oxyCODONE    Solution 10 milliGRAM(s) Enteral Tube every 6 hours PRN Severe Pain (7 - 10)  oxyCODONE    Solution 5 milliGRAM(s) Enteral Tube every 6 hours PRN Moderate Pain (4 - 6)

## 2024-05-22 LAB
ALBUMIN SERPL ELPH-MCNC: 3.5 G/DL — SIGNIFICANT CHANGE UP (ref 3.3–5)
ALP SERPL-CCNC: 191 U/L — HIGH (ref 40–120)
ALT FLD-CCNC: 111 U/L — HIGH (ref 4–41)
ANION GAP SERPL CALC-SCNC: 14 MMOL/L — SIGNIFICANT CHANGE UP (ref 7–14)
AST SERPL-CCNC: 72 U/L — HIGH (ref 4–40)
BILIRUB SERPL-MCNC: 0.3 MG/DL — SIGNIFICANT CHANGE UP (ref 0.2–1.2)
BUN SERPL-MCNC: 18 MG/DL — SIGNIFICANT CHANGE UP (ref 7–23)
CALCIUM SERPL-MCNC: 9 MG/DL — SIGNIFICANT CHANGE UP (ref 8.4–10.5)
CHLORIDE SERPL-SCNC: 105 MMOL/L — SIGNIFICANT CHANGE UP (ref 98–107)
CO2 SERPL-SCNC: 22 MMOL/L — SIGNIFICANT CHANGE UP (ref 22–31)
CREAT SERPL-MCNC: 0.61 MG/DL — SIGNIFICANT CHANGE UP (ref 0.5–1.3)
EGFR: 125 ML/MIN/1.73M2 — SIGNIFICANT CHANGE UP
GLUCOSE SERPL-MCNC: 80 MG/DL — SIGNIFICANT CHANGE UP (ref 70–99)
HCT VFR BLD CALC: 32.5 % — LOW (ref 39–50)
HGB BLD-MCNC: 10.7 G/DL — LOW (ref 13–17)
MAGNESIUM SERPL-MCNC: 2.3 MG/DL — SIGNIFICANT CHANGE UP (ref 1.6–2.6)
MCHC RBC-ENTMCNC: 26.9 PG — LOW (ref 27–34)
MCHC RBC-ENTMCNC: 32.9 GM/DL — SIGNIFICANT CHANGE UP (ref 32–36)
MCV RBC AUTO: 81.7 FL — SIGNIFICANT CHANGE UP (ref 80–100)
NRBC # BLD: 0 /100 WBCS — SIGNIFICANT CHANGE UP (ref 0–0)
NRBC # FLD: 0 K/UL — SIGNIFICANT CHANGE UP (ref 0–0)
PHOSPHATE SERPL-MCNC: 3.6 MG/DL — SIGNIFICANT CHANGE UP (ref 2.5–4.5)
PLATELET # BLD AUTO: 398 K/UL — SIGNIFICANT CHANGE UP (ref 150–400)
POTASSIUM SERPL-MCNC: 3.9 MMOL/L — SIGNIFICANT CHANGE UP (ref 3.5–5.3)
POTASSIUM SERPL-SCNC: 3.9 MMOL/L — SIGNIFICANT CHANGE UP (ref 3.5–5.3)
PROT SERPL-MCNC: 7.3 G/DL — SIGNIFICANT CHANGE UP (ref 6–8.3)
RBC # BLD: 3.98 M/UL — LOW (ref 4.2–5.8)
RBC # FLD: 11.9 % — SIGNIFICANT CHANGE UP (ref 10.3–14.5)
SODIUM SERPL-SCNC: 141 MMOL/L — SIGNIFICANT CHANGE UP (ref 135–145)
WBC # BLD: 7.57 K/UL — SIGNIFICANT CHANGE UP (ref 3.8–10.5)
WBC # FLD AUTO: 7.57 K/UL — SIGNIFICANT CHANGE UP (ref 3.8–10.5)

## 2024-05-22 PROCEDURE — 99232 SBSQ HOSP IP/OBS MODERATE 35: CPT

## 2024-05-22 RX ORDER — GABAPENTIN 400 MG/1
600 CAPSULE ORAL DAILY
Refills: 0 | Status: DISCONTINUED | OUTPATIENT
Start: 2024-05-22 | End: 2024-05-24

## 2024-05-22 RX ORDER — OXYCODONE HYDROCHLORIDE 5 MG/1
10 TABLET ORAL EVERY 6 HOURS
Refills: 0 | Status: DISCONTINUED | OUTPATIENT
Start: 2024-05-22 | End: 2024-05-24

## 2024-05-22 RX ORDER — ENOXAPARIN SODIUM 100 MG/ML
40 INJECTION SUBCUTANEOUS EVERY 24 HOURS
Refills: 0 | Status: DISCONTINUED | OUTPATIENT
Start: 2024-05-22 | End: 2024-05-24

## 2024-05-22 RX ORDER — OXYCODONE HYDROCHLORIDE 5 MG/1
5 TABLET ORAL EVERY 6 HOURS
Refills: 0 | Status: DISCONTINUED | OUTPATIENT
Start: 2024-05-22 | End: 2024-05-24

## 2024-05-22 RX ORDER — ACETAMINOPHEN 500 MG
650 TABLET ORAL EVERY 6 HOURS
Refills: 0 | Status: DISCONTINUED | OUTPATIENT
Start: 2024-05-22 | End: 2024-05-24

## 2024-05-22 RX ORDER — SENNA PLUS 8.6 MG/1
10 TABLET ORAL DAILY
Refills: 0 | Status: DISCONTINUED | OUTPATIENT
Start: 2024-05-22 | End: 2024-05-24

## 2024-05-22 RX ORDER — POLYETHYLENE GLYCOL 3350 17 G/17G
17 POWDER, FOR SOLUTION ORAL DAILY
Refills: 0 | Status: DISCONTINUED | OUTPATIENT
Start: 2024-05-22 | End: 2024-05-24

## 2024-05-22 RX ORDER — PANTOPRAZOLE SODIUM 20 MG/1
40 TABLET, DELAYED RELEASE ORAL DAILY
Refills: 0 | Status: DISCONTINUED | OUTPATIENT
Start: 2024-05-22 | End: 2024-05-24

## 2024-05-22 RX ORDER — CHLORHEXIDINE GLUCONATE 213 G/1000ML
15 SOLUTION TOPICAL
Refills: 0 | Status: DISCONTINUED | OUTPATIENT
Start: 2024-05-22 | End: 2024-05-24

## 2024-05-22 RX ADMIN — SENNA PLUS 10 MILLILITER(S): 8.6 TABLET ORAL at 13:21

## 2024-05-22 RX ADMIN — AMPICILLIN SODIUM AND SULBACTAM SODIUM 200 GRAM(S): 250; 125 INJECTION, POWDER, FOR SUSPENSION INTRAMUSCULAR; INTRAVENOUS at 05:24

## 2024-05-22 RX ADMIN — CHLORHEXIDINE GLUCONATE 15 MILLILITER(S): 213 SOLUTION TOPICAL at 18:50

## 2024-05-22 RX ADMIN — PANTOPRAZOLE SODIUM 40 MILLIGRAM(S): 20 TABLET, DELAYED RELEASE ORAL at 11:45

## 2024-05-22 RX ADMIN — AMPICILLIN SODIUM AND SULBACTAM SODIUM 200 GRAM(S): 250; 125 INJECTION, POWDER, FOR SUSPENSION INTRAMUSCULAR; INTRAVENOUS at 18:40

## 2024-05-22 RX ADMIN — ENOXAPARIN SODIUM 40 MILLIGRAM(S): 100 INJECTION SUBCUTANEOUS at 02:34

## 2024-05-22 RX ADMIN — AMPICILLIN SODIUM AND SULBACTAM SODIUM 200 GRAM(S): 250; 125 INJECTION, POWDER, FOR SUSPENSION INTRAMUSCULAR; INTRAVENOUS at 11:45

## 2024-05-22 RX ADMIN — CHLORHEXIDINE GLUCONATE 15 MILLILITER(S): 213 SOLUTION TOPICAL at 05:31

## 2024-05-22 RX ADMIN — Medication 1000 MILLIGRAM(S): at 05:24

## 2024-05-22 RX ADMIN — GABAPENTIN 600 MILLIGRAM(S): 400 CAPSULE ORAL at 11:45

## 2024-05-22 RX ADMIN — POLYETHYLENE GLYCOL 3350 17 GRAM(S): 17 POWDER, FOR SOLUTION ORAL at 11:45

## 2024-05-22 NOTE — PROGRESS NOTE ADULT - SUBJECTIVE AND OBJECTIVE BOX
[facility] Division of Hospital Medicine  Nithin Salinas MD  Pager [phone]    Patient is a 39y old  Male who presents with a chief complaint of SCC (21 May 2024 14:57)      SUBJECTIVE / OVERNIGHT EVENTS: no c/o      MEDICATIONS  (STANDING):  ampicillin/sulbactam  IVPB      ampicillin/sulbactam  IVPB 3 Gram(s) IV Intermittent every 6 hours  chlorhexidine 0.12% Liquid 15 milliLiter(s) Swish and Spit two times a day  enoxaparin Injectable 40 milliGRAM(s) SubCutaneous every 24 hours  gabapentin Solution 600 milliGRAM(s) Oral daily  pantoprazole   Suspension 40 milliGRAM(s) Oral daily  polyethylene glycol 3350 17 Gram(s) Oral daily  senna Syrup 10 milliLiter(s) Oral daily    MEDICATIONS  (PRN):  acetaminophen     Tablet .. 650 milliGRAM(s) Oral every 6 hours PRN Mild Pain (1 - 3)  ondansetron Injectable 4 milliGRAM(s) IV Push every 8 hours PRN Nausea and/or Vomiting  oxyCODONE    Solution 10 milliGRAM(s) Oral every 6 hours PRN Severe Pain (7 - 10)  oxyCODONE    Solution 5 milliGRAM(s) Oral every 6 hours PRN Moderate Pain (4 - 6)      CAPILLARY BLOOD GLUCOSE        I&O's Summary    21 May 2024 07:01  -  22 May 2024 07:00  --------------------------------------------------------  IN: 2640 mL / OUT: 505 mL / NET: 2135 mL    22 May 2024 07:01  -  22 May 2024 13:54  --------------------------------------------------------  IN: 650 mL / OUT: 0 mL / NET: 650 mL        PHYSICAL EXAM:  Vital Signs Last 24 Hrs  T(C): 36.8 (22 May 2024 10:00), Max: 37 (22 May 2024 02:15)  T(F): 98.2 (22 May 2024 10:00), Max: 98.6 (22 May 2024 02:15)  HR: 91 (22 May 2024 10:00) (85 - 98)  BP: 121/67 (22 May 2024 10:00) (100/61 - 122/71)  BP(mean): --  RR: 18 (22 May 2024 10:00) (17 - 18)  SpO2: 97% (22 May 2024 10:00) (96% - 99%)    Parameters below as of 22 May 2024 10:00  Patient On (Oxygen Delivery Method): room air      CONSTITUTIONAL: NAD  EYES: EOMI, conjunctiva and sclera clear  ENMT: Moist oral mucosa  NECK: post surgical changes CDI  RESPIRATORY: Breathing unlabored, CTAB  CARDIOVASCULAR: S1S2 no MRG  ABDOMEN: Nontender to palpation, normoactive bowel sounds, no rebound/guarding  MUSCULOSKELETAL: no clubbing or cyanosis of digits  NEUROLOGY: No focal deficits   SKIN: No rashes or lesions    LABS:                        10.7   7.57  )-----------( 398      ( 22 May 2024 05:26 )             32.5     05-22    141  |  105  |  18  ----------------------------<  80  3.9   |  22  |  0.61    Ca    9.0      22 May 2024 05:26  Phos  3.6     05-22  Mg     2.30     05-22    TPro  7.3  /  Alb  3.5  /  TBili  0.3  /  DBili  x   /  AST  72<H>  /  ALT  111<H>  /  AlkPhos  191<H>  05-22          Urinalysis Basic - ( 22 May 2024 05:26 )    Color: x / Appearance: x / SG: x / pH: x  Gluc: 80 mg/dL / Ketone: x  / Bili: x / Urobili: x   Blood: x / Protein: x / Nitrite: x   Leuk Esterase: x / RBC: x / WBC x   Sq Epi: x / Non Sq Epi: x / Bacteria: x        Culture - Sputum (collected 20 May 2024 12:43)  Source: Trach Asp Tracheal Aspirate  Gram Stain (20 May 2024 21:48):    Numerous polymorphonuclear leukocytes per low power field    No Squamous epithelial cells per low power field    Rare Gram positive cocci in pairs per oil power field  Final Report (21 May 2024 23:17):    Normal Respiratory Keisha present    Culture - Blood (collected 20 May 2024 03:25)  Source: .Blood Blood  Preliminary Report (22 May 2024 09:02):    No growth at 48 Hours    Culture - Blood (collected 20 May 2024 03:15)  Source: .Blood Blood  Preliminary Report (22 May 2024 09:02):    No growth at 48 Hours

## 2024-05-22 NOTE — PROGRESS NOTE ADULT - ASSESSMENT
38yMale with newly diagnosed L buccal invasive SCC s/p L buccal composite rxn, SND, trach, L RFFF, L STSG 5/16, recovering well in SICU.    - ERAS protocol  - q4h checks with cook and exam  - cw vac to LUE  - pain control as needed  - keep >    Padilla Mujica MD  Plastic and Reconstructive Surgery, PGY-1  HENRY: [id number]  NS: [phone]

## 2024-05-22 NOTE — PROGRESS NOTE ADULT - SUBJECTIVE AND OBJECTIVE BOX
Plastic Surgery Progress Note (pg [facility]: [id number], NS: [phone])    SUBJECTIVE  The patient was seen and examined.     OBJECTIVE  ___________________________________________________  VITAL SIGNS / I&O's   Vital Signs Last 24 Hrs  T(C): 36.8 (22 May 2024 05:33), Max: 37 (21 May 2024 10:00)  T(F): 98.3 (22 May 2024 05:33), Max: 98.6 (21 May 2024 10:00)  HR: 98 (22 May 2024 05:33) (85 - 98)  BP: 106/77 (22 May 2024 05:33) (100/61 - 122/71)  BP(mean): --  RR: 17 (22 May 2024 05:33) (17 - 18)  SpO2: 98% (22 May 2024 05:33) (96% - 99%)    Parameters below as of 22 May 2024 05:33  Patient On (Oxygen Delivery Method): room air          21 May 2024 07:01  -  22 May 2024 07:00  --------------------------------------------------------  IN:    Enteral Tube Flush: 900 mL    IV PiggyBack: 300 mL    Jevity 1.5: 1040 mL    Oral Fluid: 400 mL  Total IN: 2640 mL    OUT:    Bulb (mL): 5 mL    VAC (Vacuum Assisted Closure) System (mL): 0 mL    Voided (mL): 500 mL  Total OUT: 505 mL    Total NET: 2135 mL        ___________________________________________________  PHYSICAL EXAM    -- CONSTITUTIONAL: NAD, lying in bed  -- NEURO: Awake, alert  -- HEENT:  intraoral flap appropriate color, no congestion  neck soft, no collections  incision cdi  cook doppler +   LUE: vac in place holding suction, splint in place    ___________________________________________________  LABS                        10.7   7.57  )-----------( 398      ( 22 May 2024 05:26 )             32.5     22 May 2024 05:26    141    |  105    |  18     ----------------------------<  80     3.9     |  22     |  0.61     Ca    9.0        22 May 2024 05:26  Phos  3.6       22 May 2024 05:26  Mg     2.30      22 May 2024 05:26    TPro  7.3    /  Alb  3.5    /  TBili  0.3    /  DBili  x      /  AST  72     /  ALT  111    /  AlkPhos  191    22 May 2024 05:26      CAPILLARY BLOOD GLUCOSE            Urinalysis Basic - ( 22 May 2024 05:26 )    Color: x / Appearance: x / SG: x / pH: x  Gluc: 80 mg/dL / Ketone: x  / Bili: x / Urobili: x   Blood: x / Protein: x / Nitrite: x   Leuk Esterase: x / RBC: x / WBC x   Sq Epi: x / Non Sq Epi: x / Bacteria: x      ___________________________________________________  MICRO  Recent Cultures:  Specimen Source: Trach Asp Tracheal Aspirate, 05-20 @ 12:43; Results   Normal Respiratory Keisha present<!>; Gram Stain:   Numerous polymorphonuclear leukocytes per low power field  No Squamous epithelial cells per low power field  Rare Gram positive cocci in pairs per oil power field<!>; Organism: --  Specimen Source: .Blood Blood, 05-20 @ 03:25; Results   No growth at 24 hours; Gram Stain: --; Organism: --  Specimen Source: .Blood Blood, 05-20 @ 03:15; Results   No growth at 24 hours; Gram Stain: --; Organism: --    ___________________________________________________  MEDICATIONS  (STANDING):  acetaminophen   Oral Liquid .. 1000 milliGRAM(s) Enteral Tube every 6 hours  ampicillin/sulbactam  IVPB      ampicillin/sulbactam  IVPB 3 Gram(s) IV Intermittent every 6 hours  chlorhexidine 0.12% Liquid 15 milliLiter(s) Swish and Spit two times a day  enoxaparin Injectable 40 milliGRAM(s) SubCutaneous every 24 hours  gabapentin Solution 600 milliGRAM(s) Oral daily  pantoprazole  Injectable 40 milliGRAM(s) IV Push daily  polyethylene glycol 3350 17 Gram(s) Oral daily  senna Syrup 10 milliLiter(s) Oral daily    MEDICATIONS  (PRN):  ondansetron Injectable 4 milliGRAM(s) IV Push every 8 hours PRN Nausea and/or Vomiting  oxyCODONE    Solution 5 milliGRAM(s) Enteral Tube every 6 hours PRN Moderate Pain (4 - 6)  oxyCODONE    Solution 10 milliGRAM(s) Enteral Tube every 6 hours PRN Severe Pain (7 - 10)

## 2024-05-22 NOTE — CHART NOTE - NSCHARTNOTEFT_GEN_A_CORE
Verbal consent obtained prior to procedure. RN and SICU tea notified prior to procedure  Suction setup at bedside with suction catheter attached. Ambu-bag available at bedside with 100% FiO2  Patient places on supine position with neck slightly extended. Sutures secured the tracheostomy tube cut using scissor, tracheostomy tube Shiley [id number] removed from the stoma. Tracheostomy stoma is covered with guaze, no sign of distress. Patient tolerated the procedure well.

## 2024-05-22 NOTE — PROGRESS NOTE ADULT - ASSESSMENT
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    - ERAS protocol  - q4h flap checks  - Possible decannulation today  - IV unasyn  - Fu fever workup  - pain control as needed

## 2024-05-22 NOTE — PROGRESS NOTE ADULT - SUBJECTIVE AND OBJECTIVE BOX
OTOLARYNGOLOGY (ENT) PROGRESS NOTE    PATIENT: BERNA BENDER  MRN: [id number]  : 85  [id number]  DATE OF SERVICE:  24  	  Subjective/ Interval:   Patient doing well with cap for 24 hrs. Pt doing well and tolerating CLD.    Physical Exam:  Alert, NAD  Nonlabored Respirations RA, no stridor or stertor   OC/OP: left buccal flap with good color and turgor  Neck: soft/flat, incision c/d/i, 6CN trach in place, capped  Cook signal strong  L arm in ace wrap     Vital Signs Last 24 Hrs  T(C): 36.8 (22 May 2024 05:33), Max: 37 (21 May 2024 10:00)  T(F): 98.3 (22 May 2024 05:33), Max: 98.6 (21 May 2024 10:00)  HR: 98 (22 May 2024 05:33) (85 - 98)  BP: 106/77 (22 May 2024 05:33) (100/61 - 122/71)  BP(mean): --  RR: 17 (22 May 2024 05:33) (17 - 18)  SpO2: 98% (22 May 2024 05:33) (96% - 99%)    Parameters below as of 22 May 2024 05:33  Patient On (Oxygen Delivery Method): room air        LABS:                        10.7   7.57  )-----------( 398      ( 22 May 2024 05:26 )             32.5     05-21    139  |  104  |  17  ----------------------------<  151<H>  3.7   |  22  |  0.60    Ca    9.3      21 May 2024 05:30  Phos  2.6     05-  Mg     2.30         TPro  7.5  /  Alb  3.8  /  TBili  0.6  /  DBili  x   /  AST  64<H>  /  ALT  90<H>  /  AlkPhos  163<H>

## 2024-05-22 NOTE — PROGRESS NOTE ADULT - ASSESSMENT
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  Agree cover with broad spectrum antibiotics for now, monitor fever curve, f/u cultures.  Leukocytosis and fever (sepsis) has resolved.  CXR without consolidation, but at risk for aspiration pneumonia. Agree downgrade to unasyn and complete a 5 day course abx empiric treatment for aspiration pneumonia if remains stable    Transaminitis: Mild, asymptomatic, but continue to creep up.  Monitor LFTs, may be due to surgical stressor or sepsis.  Abd US unremarkable. Checking hepatitis serologies.      SCC: Postoperative management per ENT and plastic surgery.  Lovenox VTE ppx.

## 2024-05-23 ENCOUNTER — TRANSCRIPTION ENCOUNTER (OUTPATIENT)
Age: 39
End: 2024-05-23

## 2024-05-23 LAB
ALBUMIN SERPL ELPH-MCNC: 3.5 G/DL — SIGNIFICANT CHANGE UP (ref 3.3–5)
ALP SERPL-CCNC: 180 U/L — HIGH (ref 40–120)
ALT FLD-CCNC: 95 U/L — HIGH (ref 4–41)
ANION GAP SERPL CALC-SCNC: 12 MMOL/L — SIGNIFICANT CHANGE UP (ref 7–14)
AST SERPL-CCNC: 48 U/L — HIGH (ref 4–40)
BILIRUB SERPL-MCNC: 0.4 MG/DL — SIGNIFICANT CHANGE UP (ref 0.2–1.2)
BUN SERPL-MCNC: 16 MG/DL — SIGNIFICANT CHANGE UP (ref 7–23)
CALCIUM SERPL-MCNC: 9 MG/DL — SIGNIFICANT CHANGE UP (ref 8.4–10.5)
CHLORIDE SERPL-SCNC: 104 MMOL/L — SIGNIFICANT CHANGE UP (ref 98–107)
CO2 SERPL-SCNC: 22 MMOL/L — SIGNIFICANT CHANGE UP (ref 22–31)
CREAT SERPL-MCNC: 0.63 MG/DL — SIGNIFICANT CHANGE UP (ref 0.5–1.3)
EGFR: 124 ML/MIN/1.73M2 — SIGNIFICANT CHANGE UP
GLUCOSE SERPL-MCNC: 106 MG/DL — HIGH (ref 70–99)
MAGNESIUM SERPL-MCNC: 2.2 MG/DL — SIGNIFICANT CHANGE UP (ref 1.6–2.6)
PHOSPHATE SERPL-MCNC: 2.9 MG/DL — SIGNIFICANT CHANGE UP (ref 2.5–4.5)
POTASSIUM SERPL-MCNC: 4 MMOL/L — SIGNIFICANT CHANGE UP (ref 3.5–5.3)
POTASSIUM SERPL-SCNC: 4 MMOL/L — SIGNIFICANT CHANGE UP (ref 3.5–5.3)
PROT SERPL-MCNC: 7.1 G/DL — SIGNIFICANT CHANGE UP (ref 6–8.3)
SODIUM SERPL-SCNC: 138 MMOL/L — SIGNIFICANT CHANGE UP (ref 135–145)

## 2024-05-23 PROCEDURE — 99232 SBSQ HOSP IP/OBS MODERATE 35: CPT

## 2024-05-23 RX ORDER — OXYCODONE HYDROCHLORIDE 5 MG/1
5 TABLET ORAL
Qty: 100 | Refills: 0
Start: 2024-05-23 | End: 2024-05-27

## 2024-05-23 RX ORDER — GABAPENTIN 400 MG/1
12 CAPSULE ORAL
Qty: 180 | Refills: 0
Start: 2024-05-23 | End: 2024-06-06

## 2024-05-23 RX ORDER — ACETAMINOPHEN 500 MG
2 TABLET ORAL
Qty: 0 | Refills: 0 | DISCHARGE
Start: 2024-05-23

## 2024-05-23 RX ORDER — CHLORHEXIDINE GLUCONATE 213 G/1000ML
15 SOLUTION TOPICAL
Qty: 450 | Refills: 0
Start: 2024-05-23 | End: 2024-06-06

## 2024-05-23 RX ORDER — PANTOPRAZOLE SODIUM 20 MG/1
1 TABLET, DELAYED RELEASE ORAL
Qty: 30 | Refills: 0
Start: 2024-05-23 | End: 2024-06-21

## 2024-05-23 RX ADMIN — AMPICILLIN SODIUM AND SULBACTAM SODIUM 200 GRAM(S): 250; 125 INJECTION, POWDER, FOR SUSPENSION INTRAMUSCULAR; INTRAVENOUS at 05:07

## 2024-05-23 RX ADMIN — PANTOPRAZOLE SODIUM 40 MILLIGRAM(S): 20 TABLET, DELAYED RELEASE ORAL at 12:29

## 2024-05-23 RX ADMIN — ENOXAPARIN SODIUM 40 MILLIGRAM(S): 100 INJECTION SUBCUTANEOUS at 17:54

## 2024-05-23 RX ADMIN — CHLORHEXIDINE GLUCONATE 15 MILLILITER(S): 213 SOLUTION TOPICAL at 05:08

## 2024-05-23 RX ADMIN — POLYETHYLENE GLYCOL 3350 17 GRAM(S): 17 POWDER, FOR SOLUTION ORAL at 12:29

## 2024-05-23 RX ADMIN — AMPICILLIN SODIUM AND SULBACTAM SODIUM 200 GRAM(S): 250; 125 INJECTION, POWDER, FOR SUSPENSION INTRAMUSCULAR; INTRAVENOUS at 00:09

## 2024-05-23 RX ADMIN — GABAPENTIN 600 MILLIGRAM(S): 400 CAPSULE ORAL at 12:28

## 2024-05-23 RX ADMIN — AMPICILLIN SODIUM AND SULBACTAM SODIUM 200 GRAM(S): 250; 125 INJECTION, POWDER, FOR SUSPENSION INTRAMUSCULAR; INTRAVENOUS at 12:29

## 2024-05-23 RX ADMIN — AMPICILLIN SODIUM AND SULBACTAM SODIUM 200 GRAM(S): 250; 125 INJECTION, POWDER, FOR SUSPENSION INTRAMUSCULAR; INTRAVENOUS at 17:50

## 2024-05-23 RX ADMIN — AMPICILLIN SODIUM AND SULBACTAM SODIUM 200 GRAM(S): 250; 125 INJECTION, POWDER, FOR SUSPENSION INTRAMUSCULAR; INTRAVENOUS at 23:04

## 2024-05-23 RX ADMIN — SENNA PLUS 10 MILLILITER(S): 8.6 TABLET ORAL at 12:29

## 2024-05-23 RX ADMIN — CHLORHEXIDINE GLUCONATE 15 MILLILITER(S): 213 SOLUTION TOPICAL at 17:50

## 2024-05-23 NOTE — CHART NOTE - NSCHARTNOTESELECT_GEN_ALL_CORE
ENT/Event Note
Floor Transfer
Follow-up/Nutrition Services
NGT adjustment/Event Note
Nutrition Consult/Nutrition Services

## 2024-05-23 NOTE — DISCHARGE NOTE PROVIDER - NSDCCPCAREPLAN_GEN_ALL_CORE_FT
PRINCIPAL DISCHARGE DIAGNOSIS  Diagnosis: Buccal mass  Assessment and Plan of Treatment: - Please follow up with Dr. Jeffries as scheduled

## 2024-05-23 NOTE — CHART NOTE - NSCHARTNOTEFT_GEN_A_CORE
Source: Patient [X]    Family [ ]     Other (Chart Review) [X]    Language: Patient is Frank speaking. Language line  [id number] utilized to conduct interview.     Medical Course: Per chart review, patient is a 39y Male with PMH presented with L sided jaw and face pain 5/9/24, was found to have L buccal invasive SCC, s/p L buccal composite resection, trach, L RFFF and L STSG on 5/16/24, transferred to surgical floor 5/19/24, with fever and hypoxia 5/20    Nutrition Course: Patient had previously been receiving enteral nutrition via NGT, advanced to clear liquids, full liquids, now pureed textures as of this morning. Patient seen at bedside this AM by writer with language line . Patient notes his facial swelling causes some difficulty with eating, however reports he is able to take small amounts of the pureed items. Tolerating well. Documented on RN flowsheet as consuming 26-75% of meals. Patient reports he prefers low sugar food items, coordinated via CBORD. Patient reports constipation, noted to be on a bowel regimen. No report nausea, vomiting, diarrhea. Noted HbA1c 5.2% (5/16), within normal limits.    Diet : Diet, Pureed (05-23-24 @ 07:41)    Anthropometrics  Weights per RN flowsheet: 62.2kg (5/19), 70kg (5/16), 70kg (5/9)  % Weight Change: -7.8kg (11%) x3 days period of time; questionable accuracy given large change over a short timeframe, weight scale discrepancy?    Pertinent Medications: MEDICATIONS  (STANDING):  ampicillin/sulbactam  IVPB      ampicillin/sulbactam  IVPB 3 Gram(s) IV Intermittent every 6 hours  chlorhexidine 0.12% Liquid 15 milliLiter(s) Swish and Spit two times a day  enoxaparin Injectable 40 milliGRAM(s) SubCutaneous every 24 hours  gabapentin Solution 600 milliGRAM(s) Oral daily  pantoprazole   Suspension 40 milliGRAM(s) Oral daily  polyethylene glycol 3350 17 Gram(s) Oral daily  senna Syrup 10 milliLiter(s) Oral daily    MEDICATIONS  (PRN):  acetaminophen     Tablet .. 650 milliGRAM(s) Oral every 6 hours PRN Mild Pain (1 - 3)  ondansetron Injectable 4 milliGRAM(s) IV Push every 8 hours PRN Nausea and/or Vomiting  oxyCODONE    Solution 5 milliGRAM(s) Oral every 6 hours PRN Moderate Pain (4 - 6)  oxyCODONE    Solution 10 milliGRAM(s) Oral every 6 hours PRN Severe Pain (7 - 10)    Pertinent Labs:  05-23 Na138 mmol/L Glu 106 mg/dL<H> K+ 4.0 mmol/L Cr  0.63 mg/dL BUN 16 mg/dL 05-23 Phos 2.9 mg/dL 05-23 Alb 3.5 g/dL    Skin: No pressure ulcers/injuries documented per RN flowsheets     Fluid: No edema documented per RN flowsheets     GI: Last BM 5/22/24 per RN flowsheet documentation. Noted to be on a bowel regimen.     Estimated Needs:   [X] no change since previous assessment  Weight Used: Admission Weight 70kg  Estimated Energy Needs: 1750-2100kcal (based on 25-30kcal/kg)  Estimated Protein Needs: 98-112gms (based on 1.4-1.6gms/kg)     Previous Nutrition Diagnosis: [X] Inadequate oral intake, [X] Increased nutrient needs    Nutrition Diagnosis is [X] ongoing    New Nutrition Diagnosis: Not applicable    Education: [X] Given today    Type of education provided: Writer provided verbal education regarding current diet order (pureed) and nutrition recommendations for after discharge. Patient verbalized understanding to the discussion.     Nutrition Recommendations  - Continue current diet order, as tolerated  --> Recommend SLP evaluation to optimize diet with any observation of chewing or swallowing difficulty  - Recommend Magic Cup Reduced Sugar (provides 280kcal, 9gms protein per serving) BID to support nutrition needs ( Food and Nutrition Department will provide )  - Monitor weights, labs, BM's, skin integrity, p.o. intake.   - Please monitor % PO intake on flowsheets   - RD remains available, please consult as needed     Monitoring and Evaluation:   [X] PO intake [X] Tolerance to diet prescription [X] weights [X] follow up per protocol    Rosalba Valdivia MS RDN CDN  On Microsoft Teams or Pager #[phone]

## 2024-05-23 NOTE — DISCHARGE NOTE PROVIDER - HOSPITAL COURSE
38 year old male with ulcerating left buccal mass found to be invasive SCC S/P left buccal composite resection, selective neck dissection, left radioforearm free flap, left split thickness skin graph and tracheostomy tube placement on 5/16/2024. Had NATTY drains that were monitored for output to prevent seroma formation. NATTY drains were removed. Tracheostomy was capped and decannulated. Required NG tube placement to allow wound healing. Advanced to Bolus tube feeds. Patient started on clear liquid diet and advanced to puree, tolerating well. PT evaluated patient and cleared for discharge home with outpatient PT. Plastic surgery cleared for discharge. Patient was cleared for discharge home by Dr. Jeffries on 05/23/2024. All prescriptions were sent to a pharmacy that was agreed on with the patient.

## 2024-05-23 NOTE — PROGRESS NOTE ADULT - SUBJECTIVE AND OBJECTIVE BOX
Plastic Surgery Progress Note (pg [facility]: [id number], [facility]: [phone])    SUBJECTIVE  The patient was seen and examined.     OBJECTIVE  ___________________________________________________  VITAL SIGNS / I&O's   Vital Signs Last 24 Hrs  T(C): 36.4 (23 May 2024 02:00), Max: 36.8 (22 May 2024 10:00)  T(F): 97.5 (23 May 2024 02:00), Max: 98.2 (22 May 2024 10:00)  HR: 94 (23 May 2024 02:00) (91 - 100)  BP: 109/65 (23 May 2024 02:00) (104/69 - 163/62)  BP(mean): --  RR: 18 (23 May 2024 02:00) (17 - 20)  SpO2: 97% (23 May 2024 02:00) (97% - 98%)    Parameters below as of 23 May 2024 02:00  Patient On (Oxygen Delivery Method): room air          22 May 2024 07:01  -  23 May 2024 07:00  --------------------------------------------------------  IN:    Enteral Tube Flush: 250 mL    Jevity 1.5: 520 mL    Oral Fluid: 515 mL  Total IN: 1285 mL    OUT:    VAC (Vacuum Assisted Closure) System (mL): 0 mL    Voided (mL): 400 mL  Total OUT: 400 mL    Total NET: 885 mL        ___________________________________________________  PHYSICAL EXAM    -- CONSTITUTIONAL: NAD, lying in bed  -- NEURO: Awake, alert  -- HEENT:  intraoral flap appropriate color, no congestion  neck soft, no collections  incision cdi  cook doppler +   LUE: vac in place holding suction, splint in place - vac removed at bedside: skin graft with good take    ___________________________________________________  LABS                        10.7   7.57  )-----------( 398      ( 22 May 2024 05:26 )             32.5     22 May 2024 05:26    141    |  105    |  18     ----------------------------<  80     3.9     |  22     |  0.61     Ca    9.0        22 May 2024 05:26  Phos  3.6       22 May 2024 05:26  Mg     2.30      22 May 2024 05:26    TPro  7.3    /  Alb  3.5    /  TBili  0.3    /  DBili  x      /  AST  72     /  ALT  111    /  AlkPhos  191    22 May 2024 05:26      CAPILLARY BLOOD GLUCOSE            Urinalysis Basic - ( 22 May 2024 05:26 )    Color: x / Appearance: x / SG: x / pH: x  Gluc: 80 mg/dL / Ketone: x  / Bili: x / Urobili: x   Blood: x / Protein: x / Nitrite: x   Leuk Esterase: x / RBC: x / WBC x   Sq Epi: x / Non Sq Epi: x / Bacteria: x      ___________________________________________________  MICRO  Recent Cultures:  Specimen Source: Trach Asp Tracheal Aspirate, 05-20 @ 12:43; Results   Normal Respiratory Keisha present<!>; Gram Stain:   Numerous polymorphonuclear leukocytes per low power field  No Squamous epithelial cells per low power field  Rare Gram positive cocci in pairs per oil power field<!>; Organism: --  Specimen Source: .Blood Blood, 05-20 @ 03:25; Results   No growth at 48 Hours; Gram Stain: --; Organism: --  Specimen Source: .Blood Blood, 05-20 @ 03:15; Results   No growth at 48 Hours; Gram Stain: --; Organism: --    ___________________________________________________  MEDICATIONS  (STANDING):  ampicillin/sulbactam  IVPB      ampicillin/sulbactam  IVPB 3 Gram(s) IV Intermittent every 6 hours  chlorhexidine 0.12% Liquid 15 milliLiter(s) Swish and Spit two times a day  enoxaparin Injectable 40 milliGRAM(s) SubCutaneous every 24 hours  gabapentin Solution 600 milliGRAM(s) Oral daily  pantoprazole   Suspension 40 milliGRAM(s) Oral daily  polyethylene glycol 3350 17 Gram(s) Oral daily  senna Syrup 10 milliLiter(s) Oral daily    MEDICATIONS  (PRN):  acetaminophen     Tablet .. 650 milliGRAM(s) Oral every 6 hours PRN Mild Pain (1 - 3)  ondansetron Injectable 4 milliGRAM(s) IV Push every 8 hours PRN Nausea and/or Vomiting  oxyCODONE    Solution 5 milliGRAM(s) Oral every 6 hours PRN Moderate Pain (4 - 6)  oxyCODONE    Solution 10 milliGRAM(s) Oral every 6 hours PRN Severe Pain (7 - 10)

## 2024-05-23 NOTE — DISCHARGE NOTE PROVIDER - NSDCMRMEDTOKEN_GEN_ALL_CORE_FT
0
acetaminophen 325 mg oral tablet: 2 tab(s) orally every 6 hours As needed Mild Pain (1 - 3)  chlorhexidine 0.12% mucous membrane liquid: 15 milliliter(s) mucous membrane 2 times a day  gabapentin 250 mg/5 mL oral solution: 12 milliliter(s) orally once a day  oxyCODONE 5 mg/5 mL oral solution: 5 milliliter(s) orally every 6 hours as needed for  severe pain MDD: 20  pantoprazole 40 mg oral delayed release tablet: 1 tab(s) orally once a day

## 2024-05-23 NOTE — PROGRESS NOTE ADULT - SUBJECTIVE AND OBJECTIVE BOX
[facility] Division of Hospital Medicine  Nithin Salinas MD  Pager [phone]    Patient is a 39y old  Male who presents with a chief complaint of L Buccal SCC (23 May 2024 08:51)      SUBJECTIVE / OVERNIGHT EVENTS:      MEDICATIONS  (STANDING):  ampicillin/sulbactam  IVPB      ampicillin/sulbactam  IVPB 3 Gram(s) IV Intermittent every 6 hours  chlorhexidine 0.12% Liquid 15 milliLiter(s) Swish and Spit two times a day  enoxaparin Injectable 40 milliGRAM(s) SubCutaneous every 24 hours  gabapentin Solution 600 milliGRAM(s) Oral daily  pantoprazole   Suspension 40 milliGRAM(s) Oral daily  polyethylene glycol 3350 17 Gram(s) Oral daily  senna Syrup 10 milliLiter(s) Oral daily    MEDICATIONS  (PRN):  acetaminophen     Tablet .. 650 milliGRAM(s) Oral every 6 hours PRN Mild Pain (1 - 3)  ondansetron Injectable 4 milliGRAM(s) IV Push every 8 hours PRN Nausea and/or Vomiting  oxyCODONE    Solution 5 milliGRAM(s) Oral every 6 hours PRN Moderate Pain (4 - 6)  oxyCODONE    Solution 10 milliGRAM(s) Oral every 6 hours PRN Severe Pain (7 - 10)      CAPILLARY BLOOD GLUCOSE        I&O's Summary    22 May 2024 07:01  -  23 May 2024 07:00  --------------------------------------------------------  IN: 1285 mL / OUT: 400 mL / NET: 885 mL        PHYSICAL EXAM:  Vital Signs Last 24 Hrs  T(C): 36.8 (23 May 2024 10:02), Max: 36.8 (22 May 2024 17:45)  T(F): 98.3 (23 May 2024 10:02), Max: 98.3 (23 May 2024 10:02)  HR: 86 (23 May 2024 10:02) (86 - 100)  BP: 106/55 (23 May 2024 10:02) (100/63 - 163/62)  BP(mean): --  RR: 18 (23 May 2024 10:02) (18 - 20)  SpO2: 96% (23 May 2024 10:02) (96% - 98%)    Parameters below as of 23 May 2024 10:02  Patient On (Oxygen Delivery Method): room air      CONSTITUTIONAL: NAD  EYES: EOMI, conjunctiva and sclera clear  ENMT: Moist oral mucosa  NECK: Supple  RESPIRATORY: Breathing unlabored, CTAB  CARDIOVASCULAR: S1S2 no MRG  ABDOMEN: Nontender to palpation, normoactive bowel sounds, no rebound/guarding  MUSCULOSKELETAL: no clubbing or cyanosis of digits  NEUROLOGY: No focal deficits   SKIN: No rashes or lesions    LABS:                        10.7   7.57  )-----------( 398      ( 22 May 2024 05:26 )             32.5     05-23    138  |  104  |  16  ----------------------------<  106<H>  4.0   |  22  |  0.63    Ca    9.0      23 May 2024 07:25  Phos  2.9     05-23  Mg     2.20     05-23    TPro  7.1  /  Alb  3.5  /  TBili  0.4  /  DBili  x   /  AST  48<H>  /  ALT  95<H>  /  AlkPhos  180<H>  05-23          Urinalysis Basic - ( 23 May 2024 07:25 )    Color: x / Appearance: x / SG: x / pH: x  Gluc: 106 mg/dL / Ketone: x  / Bili: x / Urobili: x   Blood: x / Protein: x / Nitrite: x   Leuk Esterase: x / RBC: x / WBC x   Sq Epi: x / Non Sq Epi: x / Bacteria: x

## 2024-05-23 NOTE — DISCHARGE NOTE PROVIDER - CARE PROVIDER_API CALL
Mervin Dev  Otolaryngology  [address] [place] [id number]  Phone: [phone]  Fax: [phone]  Follow Up Time:

## 2024-05-23 NOTE — PROGRESS NOTE ADULT - SUBJECTIVE AND OBJECTIVE BOX
OTOLARYNGOLOGY (ENT) PROGRESS NOTE    PATIENT: BERNA BENDER  MRN: [id number]  : [birth year]  [id number]  DATE OF SERVICE:  24  	  Subjective/ Interval:   Patient decannulated yesterday. Pt tolerating FLD.    Physical Exam:  Alert, NAD  Nonlabored Respirations RA, no stridor or stertor   OC/OP: left buccal flap with good color and turgor  Neck: soft/flat, incision c/d/i, decannulated  Cook signal strong  L arm in ace wrap     Vital Signs Last 24 Hrs  T(C): 36.4 (23 May 2024 02:00), Max: 36.8 (22 May 2024 10:00)  T(F): 97.5 (23 May 2024 02:00), Max: 98.2 (22 May 2024 10:00)  HR: 94 (23 May 2024 02:00) (91 - 100)  BP: 109/65 (23 May 2024 02:00) (104/69 - 163/62)  BP(mean): --  RR: 18 (23 May 2024 02:00) (17 - 20)  SpO2: 97% (23 May 2024 02:00) (97% - 98%)    Parameters below as of 23 May 2024 02:00  Patient On (Oxygen Delivery Method): room air            LABS:                        10.7   7.57  )-----------( 398      ( 22 May 2024 05:26 )             32.5         141  |  105  |  18  ----------------------------<  80  3.9   |  22  |  0.61    Ca    9.0      22 May 2024 05:26  Phos  3.6     05-  Mg     2.30         TPro  7.3  /  Alb  3.5  /  TBili  0.3  /  DBili  x   /  AST  72<H>  /  ALT  111<H>  /  AlkPhos  191<H>

## 2024-05-23 NOTE — PROGRESS NOTE ADULT - ASSESSMENT
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    - ERAS protocol  - q4h flap checks  - Decan dressing  - IV unasyn  - remove staples  - advance diet

## 2024-05-23 NOTE — PROCEDURE NOTE - NSANATOMICLLOCATION_GEN_A_CORE
Naa Brown has been seeing Dr. Jacome for panic attacks.  He would like a referral to a therapist for this.     Please call him to advise.   chin

## 2024-05-23 NOTE — DISCHARGE NOTE PROVIDER - NSDCFUADDINST_GEN_ALL_CORE_FT
- Wound Care  1. Neck: keep tracheostomy stoma covered with dry 4x4 gauze folded and tape in place. Change daily and as needed.  2. Left forearm graft site: covered with xeroform, telfa, and ace wrap to the skin donor site for 7 days. Change every 48 hours. Leave open to air and dry for 24 hours. Apply aquaphor daily for 14 days.  3. Left skin donor site:  Leave open to air and dry for 24 hours. Once dry, apply aquaphor daily for 30 days.  4. Cover the cook wire with tegaderm, and change every 48 hours or as needed until seen by Dr. Jeffries.  5. Peridex oral rinse 15ml swish and spit 2 times daily. - Wound Care  1. Neck: keep tracheostomy stoma covered with dry 4x4 gauze folded and tape in place. Change daily and as needed.  2. Left forearm graft site: covered with xeroform, telfa, and ace wrap to the skin donor site for 7 days. Change every 48 hours.   3. For the thigh donor site, leave open to air and dry for 24 hours. Apply aquaphor daily for 14 days.  3. Left skin donor site:  Leave open to air and dry for 24 hours. Once dry, apply aquaphor daily for 30 days.  4. Cover the cook wire with tegaderm, and change every 48 hours or as needed until seen by Dr. Jeffries.  5. Peridex oral rinse 15ml swish and spit 2 times daily.

## 2024-05-23 NOTE — PROGRESS NOTE ADULT - ASSESSMENT
38yMale with newly diagnosed L buccal invasive SCC s/p L buccal composite rxn, SND, trach, L RFFF, L STSG 5/16, recovering well in SICU.    - ERAS protocol  - q4h checks with exam  - Xeroform/telfa/ace to STSG  - pain control as needed  - keep >    Padilla Mujica MD  Plastic and Reconstructive Surgery, PGY-1  [facility]: [id number]

## 2024-05-23 NOTE — PROGRESS NOTE ADULT - ASSESSMENT
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  Agree cover with broad spectrum antibiotics for now, monitor fever curve, f/u cultures.  Leukocytosis and fever (sepsis) has resolved.  CXR without consolidation, but at risk for aspiration pneumonia. Agree downgrade to unasyn and complete a 5 day course abx empiric treatment for aspiration pneumonia..can switch to augmentin 875 BID to complete course    Transaminitis: Mild, asymptomatic, now starting to downtrend.  Mayay be due to surgical stressor or sepsis.  Abd US unremarkable. Checking hepatitis serologies pending.      SCC: Postoperative management per ENT and plastic surgery.  Lovenox VTE ppx.

## 2024-05-24 ENCOUNTER — TRANSCRIPTION ENCOUNTER (OUTPATIENT)
Age: 39
End: 2024-05-24

## 2024-05-24 VITALS
OXYGEN SATURATION: 98 % | DIASTOLIC BLOOD PRESSURE: 68 MMHG | HEART RATE: 86 BPM | TEMPERATURE: 98 F | SYSTOLIC BLOOD PRESSURE: 110 MMHG | RESPIRATION RATE: 18 BRPM

## 2024-05-24 LAB
HAV IGM SER-ACNC: SIGNIFICANT CHANGE UP
HBV CORE IGM SER-ACNC: SIGNIFICANT CHANGE UP
HBV SURFACE AG SER-ACNC: SIGNIFICANT CHANGE UP
HCV AB S/CO SERPL IA: 0.38 S/CO — SIGNIFICANT CHANGE UP (ref 0–0.99)
HCV AB SERPL-IMP: SIGNIFICANT CHANGE UP

## 2024-05-24 PROCEDURE — 99232 SBSQ HOSP IP/OBS MODERATE 35: CPT

## 2024-05-24 RX ADMIN — AMPICILLIN SODIUM AND SULBACTAM SODIUM 200 GRAM(S): 250; 125 INJECTION, POWDER, FOR SUSPENSION INTRAMUSCULAR; INTRAVENOUS at 13:12

## 2024-05-24 RX ADMIN — SENNA PLUS 10 MILLILITER(S): 8.6 TABLET ORAL at 13:12

## 2024-05-24 RX ADMIN — AMPICILLIN SODIUM AND SULBACTAM SODIUM 200 GRAM(S): 250; 125 INJECTION, POWDER, FOR SUSPENSION INTRAMUSCULAR; INTRAVENOUS at 05:52

## 2024-05-24 RX ADMIN — POLYETHYLENE GLYCOL 3350 17 GRAM(S): 17 POWDER, FOR SOLUTION ORAL at 13:13

## 2024-05-24 RX ADMIN — CHLORHEXIDINE GLUCONATE 15 MILLILITER(S): 213 SOLUTION TOPICAL at 05:52

## 2024-05-24 RX ADMIN — GABAPENTIN 600 MILLIGRAM(S): 400 CAPSULE ORAL at 13:13

## 2024-05-24 RX ADMIN — PANTOPRAZOLE SODIUM 40 MILLIGRAM(S): 20 TABLET, DELAYED RELEASE ORAL at 13:13

## 2024-05-24 NOTE — PROGRESS NOTE ADULT - ASSESSMENT
38M with no significant PMH presented with L sided jaw and face pain 5/9/24, was found to have L buccal invasive SCC, s/p L buccal composite resection, trach, L RFFF and L STSG on 5/16/24, transferred to surgical floor 5/19/24, with fever and hypoxia 5/20; now stable    Plan:  Sepsis: Met SIRS criteria with fever and tachycardia 5/20.  Agree cover with broad spectrum antibiotics for now, monitor fever curve, f/u cultures.  Leukocytosis and fever (sepsis) has resolved.  CXR without consolidation, but at risk for aspiration pneumonia. Agree downgrade to unasyn and complete a 5 day course abx empiric treatment for aspiration pneumonia..can switch to augmentin 875 BID to complete course    Transaminitis: Mild, asymptomatic, now starting to downtrend.  Mayay be due to surgical stressor or sepsis.  Abd US unremarkable. Checking hepatitis serologies pending.      SCC: Postoperative management per ENT and plastic surgery.  Lovenox VTE ppx.

## 2024-05-24 NOTE — DISCHARGE NOTE NURSING/CASE MANAGEMENT/SOCIAL WORK - PATIENT PORTAL LINK FT
You can access the FollowMyHealth Patient Portal offered by [facility] by registering at the following website: [email or web address]. By joining [employer]’s FollowMyHealth portal, you will also be able to view your health information using other applications (apps) compatible with our system.

## 2024-05-24 NOTE — PROGRESS NOTE ADULT - SUBJECTIVE AND OBJECTIVE BOX
[facility] Division of Hospital Medicine  Nithin Salinas MD  Pager [phone]    Patient is a 39y old  Male who presents with a chief complaint of scc (23 May 2024 14:56)      SUBJECTIVE / OVERNIGHT EVENTS: comfprtable      MEDICATIONS  (STANDING):  ampicillin/sulbactam  IVPB 3 Gram(s) IV Intermittent every 6 hours  ampicillin/sulbactam  IVPB      chlorhexidine 0.12% Liquid 15 milliLiter(s) Swish and Spit two times a day  enoxaparin Injectable 40 milliGRAM(s) SubCutaneous every 24 hours  gabapentin Solution 600 milliGRAM(s) Oral daily  pantoprazole   Suspension 40 milliGRAM(s) Oral daily  polyethylene glycol 3350 17 Gram(s) Oral daily  senna Syrup 10 milliLiter(s) Oral daily    MEDICATIONS  (PRN):  acetaminophen     Tablet .. 650 milliGRAM(s) Oral every 6 hours PRN Mild Pain (1 - 3)  ondansetron Injectable 4 milliGRAM(s) IV Push every 8 hours PRN Nausea and/or Vomiting  oxyCODONE    Solution 5 milliGRAM(s) Oral every 6 hours PRN Moderate Pain (4 - 6)  oxyCODONE    Solution 10 milliGRAM(s) Oral every 6 hours PRN Severe Pain (7 - 10)      CAPILLARY BLOOD GLUCOSE        I&O's Summary    23 May 2024 07:01  -  24 May 2024 07:00  --------------------------------------------------------  IN: 690 mL / OUT: 750 mL / NET: -60 mL    24 May 2024 07:01  -  24 May 2024 11:46  --------------------------------------------------------  IN: 0 mL / OUT: 125 mL / NET: -125 mL        PHYSICAL EXAM:  Vital Signs Last 24 Hrs  T(C): 37.4 (24 May 2024 10:02), Max: 37.4 (24 May 2024 10:02)  T(F): 99.3 (24 May 2024 10:02), Max: 99.3 (24 May 2024 10:02)  HR: 87 (24 May 2024 10:02) (76 - 95)  BP: 101/63 (24 May 2024 10:02) (99/54 - 106/66)  BP(mean): --  RR: 18 (24 May 2024 10:02) (16 - 18)  SpO2: 97% (24 May 2024 10:02) (96% - 100%)    Parameters below as of 24 May 2024 10:02  Patient On (Oxygen Delivery Method): room air      CONSTITUTIONAL: NAD  EYES: EOMI, conjunctiva and sclera clear  ENMT: Moist oral mucosa  NECK: surgical changes CDI  RESPIRATORY: Breathing unlabored, CTAB  CARDIOVASCULAR: S1S2 no MRG  ABDOMEN: Nontender to palpation, normoactive bowel sounds, no rebound/guarding  MUSCULOSKELETAL: no clubbing or cyanosis of digits  NEUROLOGY: No focal deficits   SKIN: No rashes or lesions    LABS:    05-23    138  |  104  |  16  ----------------------------<  106<H>  4.0   |  22  |  0.63    Ca    9.0      23 May 2024 07:25  Phos  2.9     05-23  Mg     2.20     05-23    TPro  7.1  /  Alb  3.5  /  TBili  0.4  /  DBili  x   /  AST  48<H>  /  ALT  95<H>  /  AlkPhos  180<H>  05-23          Urinalysis Basic - ( 23 May 2024 07:25 )    Color: x / Appearance: x / SG: x / pH: x  Gluc: 106 mg/dL / Ketone: x  / Bili: x / Urobili: x   Blood: x / Protein: x / Nitrite: x   Leuk Esterase: x / RBC: x / WBC x   Sq Epi: x / Non Sq Epi: x / Bacteria: x

## 2024-05-24 NOTE — PROGRESS NOTE ADULT - PROVIDER SPECIALTY LIST ADULT
ENT
Plastic Surgery
SICU
SICU
Anesthesia
ENT
ENT
Hospitalist
Hospitalist
Plastic Surgery
ENT
Hospitalist
Hospitalist
Plastic Surgery
Plastic Surgery
SICU
Hospitalist
Hospitalist

## 2024-05-24 NOTE — DISCHARGE NOTE NURSING/CASE MANAGEMENT/SOCIAL WORK - NSDCPEFALRISK_GEN_ALL_CORE
For information on Fall & Injury Prevention, visit: [email or web address] OR  [email or web address] OR  https://www.cdc.gov/steadi/patient.html 103.1

## 2024-05-24 NOTE — PROGRESS NOTE ADULT - SUBJECTIVE AND OBJECTIVE BOX
OTOLARYNGOLOGY (ENT) PROGRESS NOTE    PATIENT: BERNA EBNDER  MRN: [id number]  : 85  [id number]  DATE OF SERVICE:  24  	  Subjective/ Interval:   Patient decannulated. Pt tolerating FLD. Unable to be dc yesterday due to no placement options. Lengthy disussion with friends of patient who expressed not having any space to house patient.    Physical Exam:  Alert, NAD  Nonlabored Respirations RA, no stridor or stertor   OC/OP: left buccal flap with good color and turgor  Neck: soft/flat, incision c/d/i, decannulated  Cook signal strong  L arm in ace wrap     Vital Signs Last 24 Hrs  T(C): 36.8 (24 May 2024 06:00), Max: 37.1 (23 May 2024 22:00)  T(F): 98.2 (24 May 2024 06:00), Max: 98.7 (23 May 2024 22:00)  HR: 86 (24 May 2024 06:00) (76 - 95)  BP: 101/66 (24 May 2024 06:00) (99/54 - 106/66)  BP(mean): --  RR: 18 (24 May 2024 06:00) (16 - 18)  SpO2: 97% (24 May 2024 06:00) (96% - 100%)    Parameters below as of 24 May 2024 06:00  Patient On (Oxygen Delivery Method): room air          LABS:        138  |  104  |  16  ----------------------------<  106<H>  4.0   |  22  |  0.63    Ca    9.0      23 May 2024 07:25  Phos  2.9       Mg     2.20         TPro  7.1  /  Alb  3.5  /  TBili  0.4  /  DBili  x   /  AST  48<H>  /  ALT  95<H>  /  AlkPhos  180<H>

## 2024-05-24 NOTE — PROGRESS NOTE ADULT - SUBJECTIVE AND OBJECTIVE BOX
Plastic Surgery Progress Note (pg [facility]: [id number], [facility]: [phone])    SUBJECTIVE  The patient was seen and examined.     OBJECTIVE  ___________________________________________________  VITAL SIGNS / I&O's   Vital Signs Last 24 Hrs  T(C): 36.4 (23 May 2024 02:00), Max: 36.8 (22 May 2024 10:00)  T(F): 97.5 (23 May 2024 02:00), Max: 98.2 (22 May 2024 10:00)  HR: 94 (23 May 2024 02:00) (91 - 100)  BP: 109/65 (23 May 2024 02:00) (104/69 - 163/62)  BP(mean): --  RR: 18 (23 May 2024 02:00) (17 - 20)  SpO2: 97% (23 May 2024 02:00) (97% - 98%)    Parameters below as of 23 May 2024 02:00  Patient On (Oxygen Delivery Method): room air          22 May 2024 07:01  -  23 May 2024 07:00  --------------------------------------------------------  IN:    Enteral Tube Flush: 250 mL    Jevity 1.5: 520 mL    Oral Fluid: 515 mL  Total IN: 1285 mL    OUT:    VAC (Vacuum Assisted Closure) System (mL): 0 mL    Voided (mL): 400 mL  Total OUT: 400 mL    Total NET: 885 mL        ___________________________________________________  PHYSICAL EXAM    -- CONSTITUTIONAL: NAD, lying in bed  -- NEURO: Awake, alert  -- HEENT:  intraoral flap appropriate color, no congestion  neck soft, no collections  incision cdi  LUE: vac in place holding suction, splint in place - vac removed at bedside: skin graft with good take    ___________________________________________________  LABS                        10.7   7.57  )-----------( 398      ( 22 May 2024 05:26 )             32.5     22 May 2024 05:26    141    |  105    |  18     ----------------------------<  80     3.9     |  22     |  0.61     Ca    9.0        22 May 2024 05:26  Phos  3.6       22 May 2024 05:26  Mg     2.30      22 May 2024 05:26    TPro  7.3    /  Alb  3.5    /  TBili  0.3    /  DBili  x      /  AST  72     /  ALT  111    /  AlkPhos  191    22 May 2024 05:26      CAPILLARY BLOOD GLUCOSE            Urinalysis Basic - ( 22 May 2024 05:26 )    Color: x / Appearance: x / SG: x / pH: x  Gluc: 80 mg/dL / Ketone: x  / Bili: x / Urobili: x   Blood: x / Protein: x / Nitrite: x   Leuk Esterase: x / RBC: x / WBC x   Sq Epi: x / Non Sq Epi: x / Bacteria: x      ___________________________________________________  MICRO  Recent Cultures:  Specimen Source: Trach Asp Tracheal Aspirate, 05-20 @ 12:43; Results   Normal Respiratory Keisha present<!>; Gram Stain:   Numerous polymorphonuclear leukocytes per low power field  No Squamous epithelial cells per low power field  Rare Gram positive cocci in pairs per oil power field<!>; Organism: --  Specimen Source: .Blood Blood, 05-20 @ 03:25; Results   No growth at 48 Hours; Gram Stain: --; Organism: --  Specimen Source: .Blood Blood, 05-20 @ 03:15; Results   No growth at 48 Hours; Gram Stain: --; Organism: --    ___________________________________________________  MEDICATIONS  (STANDING):  ampicillin/sulbactam  IVPB      ampicillin/sulbactam  IVPB 3 Gram(s) IV Intermittent every 6 hours  chlorhexidine 0.12% Liquid 15 milliLiter(s) Swish and Spit two times a day  enoxaparin Injectable 40 milliGRAM(s) SubCutaneous every 24 hours  gabapentin Solution 600 milliGRAM(s) Oral daily  pantoprazole   Suspension 40 milliGRAM(s) Oral daily  polyethylene glycol 3350 17 Gram(s) Oral daily  senna Syrup 10 milliLiter(s) Oral daily    MEDICATIONS  (PRN):  acetaminophen     Tablet .. 650 milliGRAM(s) Oral every 6 hours PRN Mild Pain (1 - 3)  ondansetron Injectable 4 milliGRAM(s) IV Push every 8 hours PRN Nausea and/or Vomiting  oxyCODONE    Solution 5 milliGRAM(s) Oral every 6 hours PRN Moderate Pain (4 - 6)  oxyCODONE    Solution 10 milliGRAM(s) Oral every 6 hours PRN Severe Pain (7 - 10)

## 2024-05-24 NOTE — PROGRESS NOTE ADULT - ASSESSMENT
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    - Decan dressing  - Fu with SW regarding placement

## 2024-05-25 LAB
CULTURE RESULTS: SIGNIFICANT CHANGE UP
CULTURE RESULTS: SIGNIFICANT CHANGE UP
SPECIMEN SOURCE: SIGNIFICANT CHANGE UP
SPECIMEN SOURCE: SIGNIFICANT CHANGE UP

## 2024-05-29 LAB — SURGICAL PATHOLOGY STUDY: SIGNIFICANT CHANGE UP

## 2024-05-31 PROBLEM — Z00.00 ENCOUNTER FOR PREVENTIVE HEALTH EXAMINATION: Status: ACTIVE | Noted: 2024-05-31

## 2024-06-04 ENCOUNTER — APPOINTMENT (OUTPATIENT)
Dept: OTOLARYNGOLOGY | Facility: CLINIC | Age: 39
End: 2024-06-04
Payer: COMMERCIAL

## 2024-06-04 VITALS
SYSTOLIC BLOOD PRESSURE: 95 MMHG | WEIGHT: 135.25 LBS | HEART RATE: 71 BPM | DIASTOLIC BLOOD PRESSURE: 59 MMHG | BODY MASS INDEX: 20.03 KG/M2 | HEIGHT: 69 IN

## 2024-06-04 DIAGNOSIS — C06.9 MALIGNANT NEOPLASM OF MOUTH, UNSPECIFIED: ICD-10-CM

## 2024-06-04 DIAGNOSIS — Z87.891 PERSONAL HISTORY OF NICOTINE DEPENDENCE: ICD-10-CM

## 2024-06-04 PROCEDURE — 99024 POSTOP FOLLOW-UP VISIT: CPT

## 2024-06-04 PROCEDURE — 31575 DIAGNOSTIC LARYNGOSCOPY: CPT

## 2024-06-04 RX ORDER — GABAPENTIN 250 MG/5ML
300 SOLUTION ORAL
Refills: 0 | Status: ACTIVE | COMMUNITY

## 2024-06-04 RX ORDER — PANCREATIN/LIPASE/PROTEASE/AMY 2400MG
CAPSULE ORAL
Refills: 0 | Status: ACTIVE | COMMUNITY

## 2024-06-04 NOTE — PHYSICAL EXAM
[de-identified] : Incision healing well, no drainage, trach site is healing well, no LAD. [Laryngoscopy Performed] : laryngoscopy was performed, see procedure section for findings [FreeTextEntry1] : Trismus with approx. 1 cm opening.  Flap is healing well, no signs of dehiscence.  No drainage. [Normal] : no rashes

## 2024-06-04 NOTE — PROCEDURE
[Trismus] : trismus preventing mirror examination [None] : none [Flexible Endoscope] : examined with the flexible endoscope [Serial Number: ___] : Serial Number: [unfilled] [de-identified] : After patient consents, a FFL is performed.  No lesions in the NPx, OPx, HPx or larynx.  VC are mobile, airway patent.

## 2024-06-04 NOTE — HISTORY OF PRESENT ILLNESS
[de-identified] : 39M with L buccal invasive SCC, s/p L buccal composite resection, trach, L RFFF and L STSG on 5/16/24 at [facility] and here to follow and doing ok, on soft diet, mild pain and swelling mouth. loss 10-12 kg since surgery path Oral cavity, "Composite resection left buccal cancer", resection:- Squamous cell carcinoma, moderate to well differentiated, Multifocal areas of perineural invasion is identified.  Lymph nodes, left levels 1, 2, 3, and 4, neck dissection: -  1 lymph node (Level 1B)  out of 43 positive for metastatic carcinoma (1/43) - Largest deposit of metastatic tumor in lymph node measures 0.4 cm  smoke 5-6 cigs for 6 months quit 2 months ago

## 2024-06-04 NOTE — REASON FOR VISIT
[Initial Evaluation] : an initial evaluation for [[employer] ] : provided by [employer]   [FreeTextEntry2] : post op buccal cancer [Interpreters_IDNumber] : [id number] [Interpreters_FullName] : Bridgett [FreeTextEntry3] : danay

## 2024-06-05 ENCOUNTER — NON-APPOINTMENT (OUTPATIENT)
Age: 39
End: 2024-06-05

## 2024-06-11 ENCOUNTER — APPOINTMENT (OUTPATIENT)
Dept: RADIATION ONCOLOGY | Facility: CLINIC | Age: 39
End: 2024-06-11
Payer: MEDICAID

## 2024-06-11 VITALS — BODY MASS INDEX: 20.59 KG/M2 | WEIGHT: 139 LBS | RESPIRATION RATE: 18 BRPM | HEIGHT: 69 IN

## 2024-06-11 DIAGNOSIS — F17.200 NICOTINE DEPENDENCE, UNSPECIFIED, UNCOMPLICATED: ICD-10-CM

## 2024-06-11 DIAGNOSIS — Z78.9 OTHER SPECIFIED HEALTH STATUS: ICD-10-CM

## 2024-06-11 PROCEDURE — 99204 OFFICE O/P NEW MOD 45 MIN: CPT

## 2024-06-11 NOTE — VITALS
[Maximal Pain Intensity: 4/10] : 4/10 [Least Pain Intensity: 0/10] : 0/10 [Pain Description/Quality: ___] : Pain description/quality: [unfilled] [Pain Duration: ___] : Pain duration: [unfilled] [Pain Location: ___] : Pain Location: [unfilled] [Adjuvant (neuroleptics)] : adjuvant (neuroleptics) [80: Normal activity with effort; some signs or symptoms of disease.] : 80: Normal activity with effort; some signs or symptoms of disease.  [ECOG Performance Status: 1 - Restricted in physically strenuous activity but ambulatory and able to carry out work of a light or sedentary nature] : Performance Status: 1 - Restricted in physically strenuous activity but ambulatory and able to carry out work of a light or sedentary nature, e.g., light house work, office work [5 - Distress Level] : Distress Level: 5

## 2024-06-17 NOTE — PHYSICAL EXAM
[Normal] : oriented to person, place and time, the affect was normal, the mood was normal and not anxious [de-identified] : Post -surgical incision clean, trismus+++

## 2024-06-17 NOTE — REASON FOR VISIT
[Consideration of Curative Therapy] : consideration of curative therapy for [Head and Neck Cancer] : head and neck cancer [Family Member] : family member [Friend] : friend [Other: ______] : provided by [employer] [TWNoteComboBox1] : Other

## 2024-06-17 NOTE — HISTORY OF PRESENT ILLNESS
[FreeTextEntry1] : 40 y/o male with h/o oral cavity surgery for left buccal SCC in 5/2024 presents to discuss radiation options.  Initially pt presented with left mouth pain and lesion in 4/2024. 5/2024 -- Neck and chest CT scan done @ [facility]. 5/2024 -- biopsy showed Final Diagnosis ([id number], Negative margin) 1. Oral cavity, left buccal mass, biopsy - Squamous cell carcinoma, well-differentiated. 5/16/2024 -- pathology showed Final Diagnosis 1. Coronoid process, left, excision: - Viable bone, consistent with coronoid process. 2. Lip, lower lip margin, biopsy: - Ulcerated mucosa with underlying acute inflammation and striated muscle and adipose tissue; negative for tumor. - Cautery artifact is identified. 3. Oral cavity, floor of mouth margin, biopsy: - Atrophic oral mucosa with underlying patchy inflammation, striated muscle, and salivary gland lobules; negative for tumor. 4. Oral cavity, pharyngeal wall, biopsy: - Oral mucosa with underlying inflammation, striated muscle, and salivary gland lobules; negative for tumor. - Cautery artifact is identified. 5. Oral cavity, palate margin, biopsy: - Oral mucosa with underlying  inflammation, adipose tissue, and salivary gland lobules; negative for tumor. 6. Lip, upper lip margin, biopsy: - Oral mucosa with underlying inflammation, striated muscle, adipose tissue and minor salivary gland elements exhibiting sialadenitis; negative for tumor. 7. Oral cavity, "Composite resection left buccal cancer", resection: - Squamous cell carcinoma, moderate to well differentiated, see synoptic summary. -  Multifocal areas of perineural invasion is identified. - Tumor is within one high power field of the inferior buccal margin. 8. Lymph nodes, left levels 1, 2, 3, and 4, neck dissection: -  1 lymph node (Level 1B)  out of 43 positive for metastatic carcinoma (1/43) - Largest deposit of metastatic tumor in lymph node measures 0.4 cm - Submandibular gland exhibits sialadenitis.  6/11/2024 Consultation. c/o left mouth pain and trismus at times. Referred to PM&R, nutrition service, dental service.

## 2024-06-25 ENCOUNTER — OUTPATIENT (OUTPATIENT)
Dept: OUTPATIENT SERVICES | Facility: HOSPITAL | Age: 39
LOS: 1 days | End: 2024-06-25
Payer: MEDICAID

## 2024-06-25 ENCOUNTER — APPOINTMENT (OUTPATIENT)
Dept: OTOLARYNGOLOGY | Facility: CLINIC | Age: 39
End: 2024-06-25

## 2024-06-25 ENCOUNTER — APPOINTMENT (OUTPATIENT)
Dept: CT IMAGING | Facility: HOSPITAL | Age: 39
End: 2024-06-25

## 2024-06-25 DIAGNOSIS — C14.8 MALIGNANT NEOPLASM OF OVERLAPPING SITES OF LIP, ORAL CAVITY AND PHARYNX: ICD-10-CM

## 2024-06-25 PROCEDURE — 77290 THER RAD SIMULAJ FIELD CPLX: CPT

## 2024-06-25 PROCEDURE — 77263 THER RADIOLOGY TX PLNG CPLX: CPT

## 2024-06-25 PROCEDURE — 77334 RADIATION TREATMENT AID(S): CPT

## 2024-06-27 ENCOUNTER — NON-APPOINTMENT (OUTPATIENT)
Age: 39
End: 2024-06-27

## 2024-06-28 ENCOUNTER — APPOINTMENT (OUTPATIENT)
Age: 39
End: 2024-06-28
Payer: COMMERCIAL

## 2024-06-28 PROCEDURE — D0330 PANORAMIC RADIOGRAPHIC IMAGE: CPT

## 2024-06-28 PROCEDURE — D0140: CPT

## 2024-07-03 ENCOUNTER — NON-APPOINTMENT (OUTPATIENT)
Age: 39
End: 2024-07-03

## 2024-07-08 PROBLEM — Z78.9 OTHER SPECIFIED HEALTH STATUS: Chronic | Status: ACTIVE | Noted: 2024-05-09

## 2024-07-08 PROCEDURE — 77300 RADIATION THERAPY DOSE PLAN: CPT

## 2024-07-08 PROCEDURE — 77338 DESIGN MLC DEVICE FOR IMRT: CPT

## 2024-07-08 PROCEDURE — 77301 RADIOTHERAPY DOSE PLAN IMRT: CPT

## 2024-07-11 ENCOUNTER — NON-APPOINTMENT (OUTPATIENT)
Age: 39
End: 2024-07-11

## 2024-07-15 PROCEDURE — 77014: CPT

## 2024-07-15 PROCEDURE — G6015: CPT

## 2024-07-15 PROCEDURE — 77427 RADIATION TX MANAGEMENT X5: CPT

## 2024-07-16 ENCOUNTER — APPOINTMENT (OUTPATIENT)
Dept: OTOLARYNGOLOGY | Facility: CLINIC | Age: 39
End: 2024-07-16
Payer: COMMERCIAL

## 2024-07-16 VITALS
OXYGEN SATURATION: 98 % | DIASTOLIC BLOOD PRESSURE: 71 MMHG | HEART RATE: 69 BPM | SYSTOLIC BLOOD PRESSURE: 103 MMHG | WEIGHT: 141.09 LBS | BODY MASS INDEX: 22.68 KG/M2 | HEIGHT: 66 IN

## 2024-07-16 DIAGNOSIS — C06.9 MALIGNANT NEOPLASM OF MOUTH, UNSPECIFIED: ICD-10-CM

## 2024-07-16 PROCEDURE — 99024 POSTOP FOLLOW-UP VISIT: CPT

## 2024-07-16 PROCEDURE — 31575 DIAGNOSTIC LARYNGOSCOPY: CPT | Mod: 78

## 2024-07-16 PROCEDURE — 77014: CPT

## 2024-07-16 PROCEDURE — G6015: CPT

## 2024-07-17 PROCEDURE — G6015: CPT

## 2024-07-17 PROCEDURE — 77014: CPT

## 2024-07-18 ENCOUNTER — NON-APPOINTMENT (OUTPATIENT)
Age: 39
End: 2024-07-18

## 2024-07-18 VITALS — BODY MASS INDEX: 22.82 KG/M2 | RESPIRATION RATE: 16 BRPM | HEIGHT: 66 IN | WEIGHT: 142 LBS

## 2024-07-18 PROCEDURE — G6015: CPT

## 2024-07-18 PROCEDURE — 77014: CPT

## 2024-07-19 PROCEDURE — G6015: CPT

## 2024-07-19 PROCEDURE — 77014: CPT

## 2024-07-19 PROCEDURE — 77336 RADIATION PHYSICS CONSULT: CPT

## 2024-07-22 PROCEDURE — G6015: CPT

## 2024-07-22 PROCEDURE — 77014: CPT

## 2024-07-22 PROCEDURE — 77427 RADIATION TX MANAGEMENT X5: CPT

## 2024-07-23 PROCEDURE — G6015: CPT

## 2024-07-23 PROCEDURE — 77014: CPT

## 2024-07-24 PROCEDURE — G6015: CPT

## 2024-07-24 PROCEDURE — 77014: CPT

## 2024-07-25 ENCOUNTER — NON-APPOINTMENT (OUTPATIENT)
Age: 39
End: 2024-07-25

## 2024-07-25 PROCEDURE — 77014: CPT

## 2024-07-25 PROCEDURE — G6015: CPT

## 2024-07-26 PROCEDURE — G6015: CPT

## 2024-07-26 PROCEDURE — 77014: CPT

## 2024-07-26 PROCEDURE — 77336 RADIATION PHYSICS CONSULT: CPT

## 2024-07-26 RX ORDER — GABAPENTIN 300 MG/1
300 CAPSULE ORAL EVERY 8 HOURS
Qty: 90 | Refills: 1 | Status: ACTIVE | COMMUNITY
Start: 2024-07-26 | End: 1900-01-01

## 2024-07-26 RX ORDER — LIDOCAINE HYDROCHLORIDE 20 MG/ML
2 SOLUTION ORAL; TOPICAL 3 TIMES DAILY
Qty: 500 | Refills: 2 | Status: ACTIVE | COMMUNITY
Start: 2024-07-26 | End: 1900-01-01

## 2024-07-28 LAB
ALBUMIN SERPL ELPH-MCNC: 4.7 G/DL
ALP BLD-CCNC: 79 U/L
ALT SERPL-CCNC: 12 U/L
ANION GAP SERPL CALC-SCNC: 11 MMOL/L
AST SERPL-CCNC: 16 U/L
BASOPHILS # BLD AUTO: 0.02 K/UL
BASOPHILS NFR BLD AUTO: 0.3 %
BILIRUB SERPL-MCNC: 0.4 MG/DL
BUN SERPL-MCNC: 18 MG/DL
CALCIUM SERPL-MCNC: 9.1 MG/DL
CHLORIDE SERPL-SCNC: 107 MMOL/L
CO2 SERPL-SCNC: 23 MMOL/L
CREAT SERPL-MCNC: 0.71 MG/DL
EGFR: 120 ML/MIN/1.73M2
EOSINOPHIL # BLD AUTO: 0.11 K/UL
EOSINOPHIL NFR BLD AUTO: 1.9 %
GLUCOSE SERPL-MCNC: 88 MG/DL
HCT VFR BLD CALC: 42.1 %
HGB BLD-MCNC: 13 G/DL
IMM GRANULOCYTES NFR BLD AUTO: 0.2 %
LYMPHOCYTES # BLD AUTO: 1.03 K/UL
LYMPHOCYTES NFR BLD AUTO: 17.6 %
MAN DIFF?: NORMAL
MCHC RBC-ENTMCNC: 24.6 PG
MCHC RBC-ENTMCNC: 30.9 GM/DL
MCV RBC AUTO: 79.7 FL
MONOCYTES # BLD AUTO: 0.48 K/UL
MONOCYTES NFR BLD AUTO: 8.2 %
NEUTROPHILS # BLD AUTO: 4.21 K/UL
NEUTROPHILS NFR BLD AUTO: 71.8 %
PLATELET # BLD AUTO: 189 K/UL
POTASSIUM SERPL-SCNC: 4.7 MMOL/L
PROT SERPL-MCNC: 7.5 G/DL
RBC # BLD: 5.28 M/UL
RBC # FLD: 13.7 %
SODIUM SERPL-SCNC: 141 MMOL/L
WBC # FLD AUTO: 5.86 K/UL

## 2024-07-29 PROCEDURE — 77427 RADIATION TX MANAGEMENT X5: CPT

## 2024-07-29 PROCEDURE — 77014: CPT

## 2024-07-29 PROCEDURE — G6015: CPT

## 2024-07-29 NOTE — HISTORY OF PRESENT ILLNESS
[FreeTextEntry1] : 40 y/o male with h/o oral cavity surgery for left buccal SCC in 5/2024 presents to discuss radiation options.  Initially pt presented with left mouth pain and lesion in 4/2024. 5/2024 -- Neck and chest CT scan done @ [facility]. 5/2024 -- biopsy showed Final Diagnosis ([id number], Negative margin) 1. Oral cavity, left buccal mass, biopsy - Squamous cell carcinoma, well-differentiated. 5/16/2024 -- pathology showed Final Diagnosis 1. Coronoid process, left, excision: - Viable bone, consistent with coronoid process. 2. Lip, lower lip margin, biopsy: - Ulcerated mucosa with underlying acute inflammation and striated muscle and adipose tissue; negative for tumor. - Cautery artifact is identified. 3. Oral cavity, floor of mouth margin, biopsy: - Atrophic oral mucosa with underlying patchy inflammation, striated muscle, and salivary gland lobules; negative for tumor. 4. Oral cavity, pharyngeal wall, biopsy: - Oral mucosa with underlying inflammation, striated muscle, and salivary gland lobules; negative for tumor. - Cautery artifact is identified. 5. Oral cavity, palate margin, biopsy: - Oral mucosa with underlying  inflammation, adipose tissue, and salivary gland lobules; negative for tumor. 6. Lip, upper lip margin, biopsy: - Oral mucosa with underlying inflammation, striated muscle, adipose tissue and minor salivary gland elements exhibiting sialadenitis; negative for tumor. 7. Oral cavity, "Composite resection left buccal cancer", resection: - Squamous cell carcinoma, moderate to well differentiated, see synoptic summary. -  Multifocal areas of perineural invasion is identified. - Tumor is within one high power field of the inferior buccal margin. 8. Lymph nodes, left levels 1, 2, 3, and 4, neck dissection: -  1 lymph node (Level 1B)  out of 43 positive for metastatic carcinoma (1/43) - Largest deposit of metastatic tumor in lymph node measures 0.4 cm - Submandibular gland exhibits sialadenitis.  6/11/2024 Consultation. c/o left mouth pain and trismus at times. Referred to PM&R, nutrition service, dental service.   7/26/2024 OTV. 10/30 Fx of RT to left oral cavity completed. c/o left mouth pain, No dysphagia. Blood script given. Rx gabapentin, lidocaine mouthwash given.

## 2024-07-29 NOTE — DISEASE MANAGEMENT
[Clinical] : TNM Stage: c [IV] : IV [TTNM] : 4 [NTNM] : 1 [MTNM] : 0 [de-identified] : 60 Gy [de-identified] : left oral cavity

## 2024-07-29 NOTE — HISTORY OF PRESENT ILLNESS
[FreeTextEntry1] : 40 y/o male with h/o oral cavity surgery for left buccal SCC in 5/2024 presents to discuss radiation options.  Initially pt presented with left mouth pain and lesion in 4/2024. 5/2024 -- Neck and chest CT scan done @ [facility]. 5/2024 -- biopsy showed Final Diagnosis ([id number], Negative margin) 1. Oral cavity, left buccal mass, biopsy - Squamous cell carcinoma, well-differentiated. 5/16/2024 -- pathology showed Final Diagnosis 1. Coronoid process, left, excision: - Viable bone, consistent with coronoid process. 2. Lip, lower lip margin, biopsy: - Ulcerated mucosa with underlying acute inflammation and striated muscle and adipose tissue; negative for tumor. - Cautery artifact is identified. 3. Oral cavity, floor of mouth margin, biopsy: - Atrophic oral mucosa with underlying patchy inflammation, striated muscle, and salivary gland lobules; negative for tumor. 4. Oral cavity, pharyngeal wall, biopsy: - Oral mucosa with underlying inflammation, striated muscle, and salivary gland lobules; negative for tumor. - Cautery artifact is identified. 5. Oral cavity, palate margin, biopsy: - Oral mucosa with underlying  inflammation, adipose tissue, and salivary gland lobules; negative for tumor. 6. Lip, upper lip margin, biopsy: - Oral mucosa with underlying inflammation, striated muscle, adipose tissue and minor salivary gland elements exhibiting sialadenitis; negative for tumor. 7. Oral cavity, "Composite resection left buccal cancer", resection: - Squamous cell carcinoma, moderate to well differentiated, see synoptic summary. -  Multifocal areas of perineural invasion is identified. - Tumor is within one high power field of the inferior buccal margin. 8. Lymph nodes, left levels 1, 2, 3, and 4, neck dissection: -  1 lymph node (Level 1B)  out of 43 positive for metastatic carcinoma (1/43) - Largest deposit of metastatic tumor in lymph node measures 0.4 cm - Submandibular gland exhibits sialadenitis.  6/11/2024 Consultation. c/o left mouth pain and trismus at times. Referred to PM&R, nutrition service, dental service.   7/26/2024 OTV. 10/30 Fx of RT to left oral cavity completed. c/o left mouth pain, No dysphagia. Blood script given. Rx gabapentin, lidocaine mouthwash given.

## 2024-07-29 NOTE — REVIEW OF SYSTEMS
[Negative] : Allergic/Immunologic [Dysphagia: Grade 0] : Dysphagia: Grade 0 [Tinnitus - Grade 0] : Tinnitus - Grade 0 [Blurred Vision: Grade 0] : Blurred Vision: Grade 0 [Mucositis Oral: Grade 0] : Mucositis Oral: Grade 0  [Xerostomia: Grade 0] : Xerostomia: Grade 0 [Oral Pain: Grade 1 - Mild pain] : Oral Pain: Grade 1 - Mild pain [Salivary duct inflammation: Grade 1 - Slightly thickened saliva; slightly altered taste (e.g., metallic)] : Salivary duct inflammation: Grade 1 - Slightly thickened saliva; slightly altered taste (e.g., metallic) [Dysgeusia: Grade 0] : Dysgeusia: Grade 0 [Alopecia: Grade 0] : Alopecia: Grade 0 [Pruritus: Grade 0] : Pruritus: Grade 0 [Skin Atrophy: Grade 0] : Skin Atrophy: Grade 0 [Skin Hyperpigmentation: Grade 0] : Skin Hyperpigmentation: Grade 0 [Skin Induration: Grade 0] : Skin Induration: Grade 0 [Dermatitis Radiation: Grade 0] : Dermatitis Radiation: Grade 0 [FreeTextEntry4] : left oral surgery for cancer in 5/2024

## 2024-07-29 NOTE — REASON FOR VISIT
[Routine On-Treatment] : a routine on-treatment visit for [Head and Neck Cancer] : head and neck cancer [TWNoteComboBox1] : False

## 2024-07-29 NOTE — DISEASE MANAGEMENT
[Clinical] : TNM Stage: c [IV] : IV [TTNM] : 4 [NTNM] : 1 [MTNM] : 0 [de-identified] : 60 Gy [de-identified] : left oral cavity

## 2024-07-30 PROCEDURE — 77014: CPT

## 2024-07-30 PROCEDURE — G6015: CPT

## 2024-07-30 RX ORDER — OXYCODONE AND ACETAMINOPHEN 5; 325 MG/1; MG/1
5-325 TABLET ORAL EVERY 8 HOURS
Qty: 90 | Refills: 0 | Status: ACTIVE | COMMUNITY
Start: 2024-07-30 | End: 1900-01-01

## 2024-07-31 PROCEDURE — 77014: CPT

## 2024-07-31 PROCEDURE — G6015: CPT

## 2024-08-01 ENCOUNTER — NON-APPOINTMENT (OUTPATIENT)
Age: 39
End: 2024-08-01

## 2024-08-01 VITALS — BODY MASS INDEX: 22.98 KG/M2 | WEIGHT: 143 LBS | RESPIRATION RATE: 16 BRPM | HEIGHT: 66 IN

## 2024-08-01 PROCEDURE — 77014: CPT

## 2024-08-01 PROCEDURE — G6015: CPT

## 2024-08-02 PROCEDURE — G6015: CPT

## 2024-08-02 PROCEDURE — 77336 RADIATION PHYSICS CONSULT: CPT

## 2024-08-05 PROCEDURE — 77014: CPT

## 2024-08-05 PROCEDURE — 77427 RADIATION TX MANAGEMENT X5: CPT

## 2024-08-05 PROCEDURE — G6015: CPT

## 2024-08-05 NOTE — HISTORY OF PRESENT ILLNESS
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  Initially pt presented with left mouth pain and lesion in 4/2024. 5/2024 -- Neck and chest CT scan done @ [facility]. 5/2024 -- biopsy showed Final Diagnosis ([id number], Negative margin) 1. Oral cavity, left buccal mass, biopsy - Squamous cell carcinoma, well-differentiated. 5/16/2024 -- pathology showed Final Diagnosis 1. Coronoid process, left, excision: - Viable bone, consistent with coronoid process. 2. Lip, lower lip margin, biopsy: - Ulcerated mucosa with underlying acute inflammation and striated muscle and adipose tissue; negative for tumor. - Cautery artifact is identified. 3. Oral cavity, floor of mouth margin, biopsy: - Atrophic oral mucosa with underlying patchy inflammation, striated muscle, and salivary gland lobules; negative for tumor. 4. Oral cavity, pharyngeal wall, biopsy: - Oral mucosa with underlying inflammation, striated muscle, and salivary gland lobules; negative for tumor. - Cautery artifact is identified. 5. Oral cavity, palate margin, biopsy: - Oral mucosa with underlying  inflammation, adipose tissue, and salivary gland lobules; negative for tumor. 6. Lip, upper lip margin, biopsy: - Oral mucosa with underlying inflammation, striated muscle, adipose tissue and minor salivary gland elements exhibiting sialadenitis; negative for tumor. 7. Oral cavity, "Composite resection left buccal cancer", resection: - Squamous cell carcinoma, moderate to well differentiated, see synoptic summary. -  Multifocal areas of perineural invasion is identified. - Tumor is within one high power field of the inferior buccal margin. 8. Lymph nodes, left levels 1, 2, 3, and 4, neck dissection: -  1 lymph node (Level 1B)  out of 43 positive for metastatic carcinoma (1/43) - Largest deposit of metastatic tumor in lymph node measures 0.4 cm - Submandibular gland exhibits sialadenitis.  6/11/2024 Consultation. c/o left mouth pain and trismus at times. Referred to PM&R, nutrition service, dental service.   8/1/2024 OTV. 14/30 Fx of RT to left oral cavity completed. c/o left mouth pain, No dysphagia. Blood script given. Rx gabapentin, lidocaine mouthwash given last wk. Rx percocet given.  As per Pt [facility] did not have lidocaine mouthwash available at this time.

## 2024-08-05 NOTE — HISTORY OF PRESENT ILLNESS
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  Initially pt presented with left mouth pain and lesion in 4/2024. 5/2024 -- Neck and chest CT scan done @ [facility]. 5/2024 -- biopsy showed Final Diagnosis ([id number], Negative margin) 1. Oral cavity, left buccal mass, biopsy - Squamous cell carcinoma, well-differentiated. 5/16/2024 -- pathology showed Final Diagnosis 1. Coronoid process, left, excision: - Viable bone, consistent with coronoid process. 2. Lip, lower lip margin, biopsy: - Ulcerated mucosa with underlying acute inflammation and striated muscle and adipose tissue; negative for tumor. - Cautery artifact is identified. 3. Oral cavity, floor of mouth margin, biopsy: - Atrophic oral mucosa with underlying patchy inflammation, striated muscle, and salivary gland lobules; negative for tumor. 4. Oral cavity, pharyngeal wall, biopsy: - Oral mucosa with underlying inflammation, striated muscle, and salivary gland lobules; negative for tumor. - Cautery artifact is identified. 5. Oral cavity, palate margin, biopsy: - Oral mucosa with underlying  inflammation, adipose tissue, and salivary gland lobules; negative for tumor. 6. Lip, upper lip margin, biopsy: - Oral mucosa with underlying inflammation, striated muscle, adipose tissue and minor salivary gland elements exhibiting sialadenitis; negative for tumor. 7. Oral cavity, "Composite resection left buccal cancer", resection: - Squamous cell carcinoma, moderate to well differentiated, see synoptic summary. -  Multifocal areas of perineural invasion is identified. - Tumor is within one high power field of the inferior buccal margin. 8. Lymph nodes, left levels 1, 2, 3, and 4, neck dissection: -  1 lymph node (Level 1B)  out of 43 positive for metastatic carcinoma (1/43) - Largest deposit of metastatic tumor in lymph node measures 0.4 cm - Submandibular gland exhibits sialadenitis.  6/11/2024 Consultation. c/o left mouth pain and trismus at times. Referred to PM&R, nutrition service, dental service.   8/1/2024 OTV. 14/30 Fx of RT to left oral cavity completed. c/o left mouth pain, No dysphagia. Blood script given. Rx gabapentin, lidocaine mouthwash given last wk. Rx percocet given.  As per Pt [facility] did not have lidocaine mouthwash available at this time.

## 2024-08-05 NOTE — REVIEW OF SYSTEMS
Pt being discharged and re-admitted to [facility], [facility].   [FreeTextEntry4] : left oral surgery for cancer in 5/2024

## 2024-08-06 PROCEDURE — G6015: CPT

## 2024-08-07 PROCEDURE — G6015: CPT

## 2024-08-07 PROCEDURE — 77014: CPT

## 2024-08-08 ENCOUNTER — NON-APPOINTMENT (OUTPATIENT)
Age: 39
End: 2024-08-08

## 2024-08-08 ENCOUNTER — LABORATORY RESULT (OUTPATIENT)
Age: 39
End: 2024-08-08

## 2024-08-08 PROCEDURE — G6015: CPT

## 2024-08-08 PROCEDURE — 77014: CPT

## 2024-08-08 NOTE — VITALS
[Maximal Pain Intensity: 5/10] : 5/10 [Least Pain Intensity: 2/10] : 2/10 [Pain Description/Quality: ___] : Pain description/quality: [unfilled] [Pain Duration: ___] : Pain duration: [unfilled] [Pain Location: ___] : Pain Location: [unfilled] [Opioid] : opioid [Adjuvant (neuroleptics)] : adjuvant (neuroleptics) [80: Normal activity with effort; some signs or symptoms of disease.] : 80: Normal activity with effort; some signs or symptoms of disease.  [ECOG Performance Status: 1 - Restricted in physically strenuous activity but ambulatory and able to carry out work of a light or sedentary nature] : Performance Status: 1 - Restricted in physically strenuous activity but ambulatory and able to carry out work of a light or sedentary nature, e.g., light house work, office work [5 - Distress Level] : Distress Level: 5

## 2024-08-09 PROCEDURE — 77336 RADIATION PHYSICS CONSULT: CPT

## 2024-08-09 PROCEDURE — G6015: CPT

## 2024-08-12 PROCEDURE — 77014: CPT

## 2024-08-12 PROCEDURE — 77427 RADIATION TX MANAGEMENT X5: CPT

## 2024-08-12 PROCEDURE — G6015: CPT

## 2024-08-13 ENCOUNTER — APPOINTMENT (OUTPATIENT)
Dept: OTOLARYNGOLOGY | Facility: CLINIC | Age: 39
End: 2024-08-13

## 2024-08-13 PROCEDURE — G6015: CPT

## 2024-08-13 PROCEDURE — 77014: CPT

## 2024-08-14 ENCOUNTER — NON-APPOINTMENT (OUTPATIENT)
Age: 39
End: 2024-08-14

## 2024-08-14 ENCOUNTER — LABORATORY RESULT (OUTPATIENT)
Age: 39
End: 2024-08-14

## 2024-08-14 VITALS
HEART RATE: 81 BPM | TEMPERATURE: 98.3 F | SYSTOLIC BLOOD PRESSURE: 109 MMHG | DIASTOLIC BLOOD PRESSURE: 73 MMHG | OXYGEN SATURATION: 100 % | RESPIRATION RATE: 18 BRPM

## 2024-08-14 VITALS
HEART RATE: 88 BPM | WEIGHT: 139 LBS | SYSTOLIC BLOOD PRESSURE: 100 MMHG | BODY MASS INDEX: 22.44 KG/M2 | DIASTOLIC BLOOD PRESSURE: 76 MMHG

## 2024-08-14 VITALS
SYSTOLIC BLOOD PRESSURE: 109 MMHG | DIASTOLIC BLOOD PRESSURE: 73 MMHG | HEART RATE: 81 BPM | RESPIRATION RATE: 18 BRPM | OXYGEN SATURATION: 100 %

## 2024-08-14 PROCEDURE — G6015: CPT

## 2024-08-14 PROCEDURE — 77014: CPT

## 2024-08-14 RX ORDER — SODIUM CHLORIDE 9 G/ML
0.9 INJECTION, SOLUTION INTRAVENOUS WEEKLY
Qty: 1000 | Refills: 0 | Status: DISCONTINUED | COMMUNITY
Start: 2024-08-14 | End: 2024-08-14

## 2024-08-14 RX ORDER — OXYCODONE AND ACETAMINOPHEN 5; 325 MG/1; MG/1
5-325 TABLET ORAL EVERY 8 HOURS
Qty: 30 | Refills: 0 | Status: ACTIVE | COMMUNITY
Start: 2024-08-14 | End: 1900-01-01

## 2024-08-14 NOTE — DISEASE MANAGEMENT
[Clinical] : TNM Stage: c [IV] : IV [TTNM] : 4 [NTNM] : 1 [MTNM] : 0 [de-identified] : 60 Gy [de-identified] : left oral cavity

## 2024-08-14 NOTE — REVIEW OF SYSTEMS
[Negative] : Allergic/Immunologic [Dysphagia: Grade 0] : Dysphagia: Grade 0 [Tinnitus - Grade 0] : Tinnitus - Grade 0 [Blurred Vision: Grade 0] : Blurred Vision: Grade 0 [Mucositis Oral: Grade 0] : Mucositis Oral: Grade 0  [Xerostomia: Grade 0] : Xerostomia: Grade 0 [Oral Pain: Grade 1 - Mild pain] : Oral Pain: Grade 1 - Mild pain [Salivary duct inflammation: Grade 1 - Slightly thickened saliva; slightly altered taste (e.g., metallic)] : Salivary duct inflammation: Grade 1 - Slightly thickened saliva; slightly altered taste (e.g., metallic) [Dysgeusia: Grade 1- Altered taste but no change in diet] : Dysgeusia: Grade 1 - Altered taste but no change in diet [Cough: Grade 0] : Cough: Grade 0 [Alopecia: Grade 0] : Alopecia: Grade 0 [Pruritus: Grade 0] : Pruritus: Grade 0 [Skin Atrophy: Grade 0] : Skin Atrophy: Grade 0 [Skin Hyperpigmentation: Grade 0] : Skin Hyperpigmentation: Grade 0 [Skin Induration: Grade 0] : Skin Induration: Grade 0 [Dermatitis Radiation: Grade 0] : Dermatitis Radiation: Grade 0 [FreeTextEntry4] : left oral surgery for cancer in 5/2024

## 2024-08-14 NOTE — DISEASE MANAGEMENT
[Clinical] : TNM Stage: c [IV] : IV [TTNM] : 4 [NTNM] : 1 [MTNM] : 0 [de-identified] : 60 Gy [de-identified] : left oral cavity

## 2024-08-14 NOTE — HISTORY OF PRESENT ILLNESS
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  Initially pt presented with left mouth pain and lesion in 4/2024. 5/2024 -- Neck and chest CT scan done @ [facility]. 5/2024 -- biopsy showed Final Diagnosis ([id number], Negative margin) 1. Oral cavity, left buccal mass, biopsy - Squamous cell carcinoma, well-differentiated. 5/16/2024 -- pathology showed Final Diagnosis 1. Coronoid process, left, excision: - Viable bone, consistent with coronoid process. 2. Lip, lower lip margin, biopsy: - Ulcerated mucosa with underlying acute inflammation and striated muscle and adipose tissue; negative for tumor. - Cautery artifact is identified. 3. Oral cavity, floor of mouth margin, biopsy: - Atrophic oral mucosa with underlying patchy inflammation, striated muscle, and salivary gland lobules; negative for tumor. 4. Oral cavity, pharyngeal wall, biopsy: - Oral mucosa with underlying inflammation, striated muscle, and salivary gland lobules; negative for tumor. - Cautery artifact is identified. 5. Oral cavity, palate margin, biopsy: - Oral mucosa with underlying  inflammation, adipose tissue, and salivary gland lobules; negative for tumor. 6. Lip, upper lip margin, biopsy: - Oral mucosa with underlying inflammation, striated muscle, adipose tissue and minor salivary gland elements exhibiting sialadenitis; negative for tumor. 7. Oral cavity, "Composite resection left buccal cancer", resection: - Squamous cell carcinoma, moderate to well differentiated, see synoptic summary. -  Multifocal areas of perineural invasion is identified. - Tumor is within one high power field of the inferior buccal margin. 8. Lymph nodes, left levels 1, 2, 3, and 4, neck dissection: -  1 lymph node (Level 1B)  out of 43 positive for metastatic carcinoma (1/43) - Largest deposit of metastatic tumor in lymph node measures 0.4 cm - Submandibular gland exhibits sialadenitis.  6/11/2024 Consultation. c/o left mouth pain and trismus at times. Referred to PM&R, nutrition service, dental service.   8/8/2024 OTV. 19/30 Fx of RT to left oral cavity completed. c/o left mouth pain, No dysphagia. Blood script given. On gabapentin and percocet for oral pain. Rx lidocaine mouthwash given but not covered by insurance.

## 2024-08-14 NOTE — HISTORY OF PRESENT ILLNESS
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  Initially pt presented with left mouth pain and lesion in 4/2024. 5/2024 -- Neck and chest CT scan done @ [facility]. 5/2024 -- biopsy showed Final Diagnosis ([id number], Negative margin) 1. Oral cavity, left buccal mass, biopsy - Squamous cell carcinoma, well-differentiated. 5/16/2024 -- pathology showed Final Diagnosis 1. Coronoid process, left, excision: - Viable bone, consistent with coronoid process. 2. Lip, lower lip margin, biopsy: - Ulcerated mucosa with underlying acute inflammation and striated muscle and adipose tissue; negative for tumor. - Cautery artifact is identified. 3. Oral cavity, floor of mouth margin, biopsy: - Atrophic oral mucosa with underlying patchy inflammation, striated muscle, and salivary gland lobules; negative for tumor. 4. Oral cavity, pharyngeal wall, biopsy: - Oral mucosa with underlying inflammation, striated muscle, and salivary gland lobules; negative for tumor. - Cautery artifact is identified. 5. Oral cavity, palate margin, biopsy: - Oral mucosa with underlying  inflammation, adipose tissue, and salivary gland lobules; negative for tumor. 6. Lip, upper lip margin, biopsy: - Oral mucosa with underlying inflammation, striated muscle, adipose tissue and minor salivary gland elements exhibiting sialadenitis; negative for tumor. 7. Oral cavity, "Composite resection left buccal cancer", resection: - Squamous cell carcinoma, moderate to well differentiated, see synoptic summary. -  Multifocal areas of perineural invasion is identified. - Tumor is within one high power field of the inferior buccal margin. 8. Lymph nodes, left levels 1, 2, 3, and 4, neck dissection: -  1 lymph node (Level 1B)  out of 43 positive for metastatic carcinoma (1/43) - Largest deposit of metastatic tumor in lymph node measures 0.4 cm - Submandibular gland exhibits sialadenitis.  6/11/2024 Consultation. c/o left mouth pain and trismus at times. Referred to PM&R, nutrition service, dental service.   8/8/2024 OTV. 19/30 Fx of RT to left oral cavity completed. c/o left mouth pain, No dysphagia. Blood script given. On gabapentin and percocet for oral pain. Rx lidocaine mouthwash given but not covered by insurance.

## 2024-08-15 ENCOUNTER — NON-APPOINTMENT (OUTPATIENT)
Age: 39
End: 2024-08-15

## 2024-08-15 PROCEDURE — G6015: CPT

## 2024-08-15 PROCEDURE — 77014: CPT

## 2024-08-15 RX ORDER — SODIUM CHLORIDE 9 G/ML
0.9 INJECTION, SOLUTION INTRAVENOUS
Qty: 1000 | Refills: 3 | Status: ACTIVE | COMMUNITY
Start: 2024-08-14 | End: 1900-01-01

## 2024-08-16 PROCEDURE — G6015: CPT

## 2024-08-16 PROCEDURE — 77014: CPT

## 2024-08-16 PROCEDURE — 77336 RADIATION PHYSICS CONSULT: CPT

## 2024-08-19 PROCEDURE — G6015: CPT

## 2024-08-19 PROCEDURE — 77014: CPT

## 2024-08-19 PROCEDURE — 77427 RADIATION TX MANAGEMENT X5: CPT

## 2024-08-20 PROCEDURE — G6015: CPT

## 2024-08-20 PROCEDURE — 77014: CPT

## 2024-08-21 LAB
ALBUMIN SERPL ELPH-MCNC: 4.6 G/DL
ALP BLD-CCNC: 86 U/L
ALT SERPL-CCNC: 15 U/L
ANION GAP SERPL CALC-SCNC: 14 MMOL/L
AST SERPL-CCNC: 17 U/L
BASOPHILS # BLD AUTO: 0.08 K/UL
BASOPHILS NFR BLD AUTO: 1.7 %
BILIRUB SERPL-MCNC: 0.4 MG/DL
BUN SERPL-MCNC: 24 MG/DL
CALCIUM SERPL-MCNC: 9.5 MG/DL
CHLORIDE SERPL-SCNC: 103 MMOL/L
CO2 SERPL-SCNC: 23 MMOL/L
CREAT SERPL-MCNC: 0.76 MG/DL
EGFR: 117 ML/MIN/1.73M2
EOSINOPHIL # BLD AUTO: 0.08 K/UL
EOSINOPHIL NFR BLD AUTO: 1.7 %
GLUCOSE SERPL-MCNC: 81 MG/DL
HCT VFR BLD CALC: 45.9 %
HGB BLD-MCNC: 14.1 G/DL
LYMPHOCYTES # BLD AUTO: 0.34 K/UL
LYMPHOCYTES NFR BLD AUTO: 7 %
MAN DIFF?: NORMAL
MCHC RBC-ENTMCNC: 24.7 PG
MCHC RBC-ENTMCNC: 30.7 GM/DL
MCV RBC AUTO: 80.4 FL
MONOCYTES # BLD AUTO: 0.46 K/UL
MONOCYTES NFR BLD AUTO: 9.6 %
NEUTROPHILS # BLD AUTO: 3.86 K/UL
NEUTROPHILS NFR BLD AUTO: 80 %
PLATELET # BLD AUTO: 181 K/UL
POTASSIUM SERPL-SCNC: 4.6 MMOL/L
PROT SERPL-MCNC: 7.6 G/DL
RBC # BLD: 5.71 M/UL
RBC # FLD: 13.8 %
SODIUM SERPL-SCNC: 141 MMOL/L
WBC # FLD AUTO: 4.83 K/UL

## 2024-08-21 PROCEDURE — 77014: CPT

## 2024-08-21 PROCEDURE — G6015: CPT

## 2024-08-21 NOTE — DISEASE MANAGEMENT
[Clinical] : TNM Stage: c [IV] : IV [TTNM] : 4 [NTNM] : 1 [MTNM] : 0 [de-identified] : 60 Gy [de-identified] : left oral cavity

## 2024-08-21 NOTE — HISTORY OF PRESENT ILLNESS
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  Initially pt presented with left mouth pain and lesion in 4/2024. 5/2024 -- Neck and chest CT scan done @ [facility]. 5/2024 -- biopsy showed Final Diagnosis ([id number], Negative margin) 1. Oral cavity, left buccal mass, biopsy - Squamous cell carcinoma, well-differentiated. 5/16/2024 -- pathology showed Final Diagnosis 1. Coronoid process, left, excision: - Viable bone, consistent with coronoid process. 2. Lip, lower lip margin, biopsy: - Ulcerated mucosa with underlying acute inflammation and striated muscle and adipose tissue; negative for tumor. - Cautery artifact is identified. 3. Oral cavity, floor of mouth margin, biopsy: - Atrophic oral mucosa with underlying patchy inflammation, striated muscle, and salivary gland lobules; negative for tumor. 4. Oral cavity, pharyngeal wall, biopsy: - Oral mucosa with underlying inflammation, striated muscle, and salivary gland lobules; negative for tumor. - Cautery artifact is identified. 5. Oral cavity, palate margin, biopsy: - Oral mucosa with underlying  inflammation, adipose tissue, and salivary gland lobules; negative for tumor. 6. Lip, upper lip margin, biopsy: - Oral mucosa with underlying inflammation, striated muscle, adipose tissue and minor salivary gland elements exhibiting sialadenitis; negative for tumor. 7. Oral cavity, "Composite resection left buccal cancer", resection: - Squamous cell carcinoma, moderate to well differentiated, see synoptic summary. -  Multifocal areas of perineural invasion is identified. - Tumor is within one high power field of the inferior buccal margin. 8. Lymph nodes, left levels 1, 2, 3, and 4, neck dissection: -  1 lymph node (Level 1B)  out of 43 positive for metastatic carcinoma (1/43) - Largest deposit of metastatic tumor in lymph node measures 0.4 cm - Submandibular gland exhibits sialadenitis.  6/11/2024 Consultation. c/o left mouth pain and trismus at times. Referred to PM&R, nutrition service, dental service.   8/14/2024- Mr. Smith presents today for OTV.  [id number] and [id number] used for visit Has completed 22/30 fractions to the left buccal area. 4400/6000 cgy. Notes increasing pain to the left mouth. Taking gabapentin, after confirming with pharmacy found out patient was not able to fill percocet due to inadequate supply at pharmacy. Patient with decreased PO intake, eating mostly protein shakes and lentils. Is drinking water.   8/8/2024 OTV. 19/30 Fx of RT to left oral cavity completed. c/o left mouth pain, No dysphagia. Blood script given. On gabapentin and percocet for oral pain. Rx lidocaine mouthwash given but not covered by insurance.

## 2024-08-21 NOTE — DISEASE MANAGEMENT
[Clinical] : TNM Stage: c [IV] : IV [TTNM] : 4 [NTNM] : 1 [MTNM] : 0 [de-identified] : 60 Gy [de-identified] : left oral cavity

## 2024-08-21 NOTE — HISTORY OF PRESENT ILLNESS
[FreeTextEntry1] : 40 y/o male with h/o oral cavity surgery for left buccal SCC in 5/2024 presents to discuss radiation options.  Initially pt presented with left mouth pain and lesion in 4/2024. 5/2024 -- Neck and chest CT scan done @ [facility]. 5/2024 -- biopsy showed Final Diagnosis ([id number], Negative margin) 1. Oral cavity, left buccal mass, biopsy - Squamous cell carcinoma, well-differentiated. 5/16/2024 -- pathology showed Final Diagnosis 1. Coronoid process, left, excision: - Viable bone, consistent with coronoid process. 2. Lip, lower lip margin, biopsy: - Ulcerated mucosa with underlying acute inflammation and striated muscle and adipose tissue; negative for tumor. - Cautery artifact is identified. 3. Oral cavity, floor of mouth margin, biopsy: - Atrophic oral mucosa with underlying patchy inflammation, striated muscle, and salivary gland lobules; negative for tumor. 4. Oral cavity, pharyngeal wall, biopsy: - Oral mucosa with underlying inflammation, striated muscle, and salivary gland lobules; negative for tumor. - Cautery artifact is identified. 5. Oral cavity, palate margin, biopsy: - Oral mucosa with underlying  inflammation, adipose tissue, and salivary gland lobules; negative for tumor. 6. Lip, upper lip margin, biopsy: - Oral mucosa with underlying inflammation, striated muscle, adipose tissue and minor salivary gland elements exhibiting sialadenitis; negative for tumor. 7. Oral cavity, "Composite resection left buccal cancer", resection: - Squamous cell carcinoma, moderate to well differentiated, see synoptic summary. -  Multifocal areas of perineural invasion is identified. - Tumor is within one high power field of the inferior buccal margin. 8. Lymph nodes, left levels 1, 2, 3, and 4, neck dissection: -  1 lymph node (Level 1B)  out of 43 positive for metastatic carcinoma (1/43) - Largest deposit of metastatic tumor in lymph node measures 0.4 cm - Submandibular gland exhibits sialadenitis.  6/11/2024 Consultation. c/o left mouth pain and trismus at times. Referred to PM&R, nutrition service, dental service.   8/14/2024- Mr. Smith presents today for OTV.  [id number] and [id number] used for visit Has completed 22/30 fractions to the left buccal area. 4400/6000 cgy. Notes increasing pain to the left mouth. Taking gabapentin, after confirming with pharmacy found out patient was not able to fill percocet due to inadequate supply at pharmacy. Patient with decreased PO intake, eating mostly protein shakes and lentils. Is drinking water.   8/8/2024 OTV. 19/30 Fx of RT to left oral cavity completed. c/o left mouth pain, No dysphagia. Blood script given. On gabapentin and percocet for oral pain. Rx lidocaine mouthwash given but not covered by insurance.

## 2024-08-22 ENCOUNTER — NON-APPOINTMENT (OUTPATIENT)
Age: 39
End: 2024-08-22

## 2024-08-22 VITALS — WEIGHT: 136 LBS | BODY MASS INDEX: 21.86 KG/M2 | RESPIRATION RATE: 18 BRPM | HEIGHT: 66 IN

## 2024-08-22 PROCEDURE — G6015: CPT

## 2024-08-22 PROCEDURE — 77014: CPT

## 2024-08-22 RX ORDER — SILVER SULFADIAZINE 10 MG/G
1 CREAM TOPICAL TWICE DAILY
Qty: 2 | Refills: 1 | Status: ACTIVE | COMMUNITY
Start: 2024-08-22 | End: 1900-01-01

## 2024-08-22 NOTE — HISTORY OF PRESENT ILLNESS
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  Initially pt presented with left mouth pain and lesion in 4/2024. 5/2024 -- Neck and chest CT scan done @ [facility]. 5/2024 -- biopsy showed Final Diagnosis ([id number], Negative margin) 1. Oral cavity, left buccal mass, biopsy - Squamous cell carcinoma, well-differentiated. 5/16/2024 -- pathology showed Final Diagnosis 1. Coronoid process, left, excision: - Viable bone, consistent with coronoid process. 2. Lip, lower lip margin, biopsy: - Ulcerated mucosa with underlying acute inflammation and striated muscle and adipose tissue; negative for tumor. - Cautery artifact is identified. 3. Oral cavity, floor of mouth margin, biopsy: - Atrophic oral mucosa with underlying patchy inflammation, striated muscle, and salivary gland lobules; negative for tumor. 4. Oral cavity, pharyngeal wall, biopsy: - Oral mucosa with underlying inflammation, striated muscle, and salivary gland lobules; negative for tumor. - Cautery artifact is identified. 5. Oral cavity, palate margin, biopsy: - Oral mucosa with underlying  inflammation, adipose tissue, and salivary gland lobules; negative for tumor. 6. Lip, upper lip margin, biopsy: - Oral mucosa with underlying inflammation, striated muscle, adipose tissue and minor salivary gland elements exhibiting sialadenitis; negative for tumor. 7. Oral cavity, "Composite resection left buccal cancer", resection: - Squamous cell carcinoma, moderate to well differentiated, see synoptic summary. -  Multifocal areas of perineural invasion is identified. - Tumor is within one high power field of the inferior buccal margin. 8. Lymph nodes, left levels 1, 2, 3, and 4, neck dissection: -  1 lymph node (Level 1B)  out of 43 positive for metastatic carcinoma (1/43) - Largest deposit of metastatic tumor in lymph node measures 0.4 cm - Submandibular gland exhibits sialadenitis.  6/11/2024 Consultation. c/o left mouth pain and trismus at times. Referred to PM&R, nutrition service, dental service.   8/14/2024- Mr. Smith presents today for OTV.  [id number] and [id number] used for visit Has completed 22/30 fractions to the left buccal area. 4400/6000 cgy. Notes increasing pain to the left mouth. Taking gabapentin, after confirming with pharmacy found out patient was not able to fill percocet due to inadequate supply at pharmacy. Patient with decreased PO intake, eating mostly protein shakes and lentils. Is drinking water.   8/22/2024 OTV. 29/30 Fx of RT to left oral cavity completed. c/o left mouth pain, No dysphagia. Blood test okay. On hydration with Med Onc Dr Lund. On gabapentin and percocet for oral pain. Rx lidocaine mouthwash given but not covered by insurance.

## 2024-08-23 PROCEDURE — G6015: CPT

## 2024-08-23 PROCEDURE — 77014: CPT

## 2024-08-23 PROCEDURE — 77336 RADIATION PHYSICS CONSULT: CPT

## 2024-08-27 ENCOUNTER — APPOINTMENT (OUTPATIENT)
Dept: OTOLARYNGOLOGY | Facility: CLINIC | Age: 39
End: 2024-08-27
Payer: COMMERCIAL

## 2024-08-27 VITALS
HEART RATE: 75 BPM | SYSTOLIC BLOOD PRESSURE: 98 MMHG | RESPIRATION RATE: 16 BRPM | OXYGEN SATURATION: 99 % | DIASTOLIC BLOOD PRESSURE: 65 MMHG

## 2024-08-27 DIAGNOSIS — C06.9 MALIGNANT NEOPLASM OF MOUTH, UNSPECIFIED: ICD-10-CM

## 2024-08-27 PROCEDURE — 31575 DIAGNOSTIC LARYNGOSCOPY: CPT

## 2024-08-27 PROCEDURE — 99214 OFFICE O/P EST MOD 30 MIN: CPT | Mod: 25

## 2024-08-27 NOTE — HISTORY OF PRESENT ILLNESS
[de-identified] : 39M with L buccal invasive SCC, s/p L buccal composite resection, trach, L RFFF and L STSG on 5/16/24 at [facility] and path Oral cavity, "Composite resection left buccal cancer", resection:- Squamous cell carcinoma, moderate to well differentiated. PT is being f/u with Dr Rubalcava for RT started on 2/30 ( 5x / week) completed treatment on 8/23/24 Pt reports loosing weight to difficulties with eating and drinking due to sores in his mouth and dry throat. smoke 5-6 cigs for 6 months quit 2 months ago

## 2024-08-27 NOTE — PHYSICAL EXAM
[Normal] : no rashes [Laryngoscopy Performed] : laryngoscopy was performed, see procedure section for findings [de-identified] : Posttreatment changes, no LAD.  Mild dermatitis. [FreeTextEntry1] : Trismus with approx. 2 cm opening.  Posttreatment changes, moderate mucositis.

## 2024-08-27 NOTE — REASON FOR VISIT
[Subsequent Evaluation] : a subsequent evaluation for [[employer] ] : provided by [employer]   [Source: ______] : History obtained from [unfilled] [FreeTextEntry2] : f/u post op  buccal cancer and RT [Interpreters_IDNumber] : [id number] [Interpreters_FullName] : Iveth [FreeTextEntry3] : danay

## 2024-08-27 NOTE — PROCEDURE
[Trismus] : trismus preventing mirror examination [None] : none [Flexible Endoscope] : examined with the flexible endoscope [Serial Number: ___] : Serial Number: [unfilled] [de-identified] : After patient consents, a FFL is performed.  No lesions in the NPx, OPx, HPx or larynx.  Expected posttreatment changes, VC are mobile, airway patent.  Moderate mucositis.

## 2024-09-19 ENCOUNTER — APPOINTMENT (OUTPATIENT)
Dept: OTOLARYNGOLOGY | Facility: CLINIC | Age: 39
End: 2024-09-19

## 2024-10-08 ENCOUNTER — APPOINTMENT (OUTPATIENT)
Dept: OTOLARYNGOLOGY | Facility: CLINIC | Age: 39
End: 2024-10-08
Payer: COMMERCIAL

## 2024-10-08 VITALS
DIASTOLIC BLOOD PRESSURE: 75 MMHG | OXYGEN SATURATION: 99 % | HEART RATE: 71 BPM | RESPIRATION RATE: 17 BRPM | SYSTOLIC BLOOD PRESSURE: 117 MMHG | WEIGHT: 136 LBS | HEIGHT: 66 IN | BODY MASS INDEX: 21.86 KG/M2

## 2024-10-08 DIAGNOSIS — C06.9 MALIGNANT NEOPLASM OF MOUTH, UNSPECIFIED: ICD-10-CM

## 2024-10-08 PROCEDURE — 99214 OFFICE O/P EST MOD 30 MIN: CPT | Mod: 25

## 2024-10-08 PROCEDURE — 31575 DIAGNOSTIC LARYNGOSCOPY: CPT

## 2024-11-27 ENCOUNTER — APPOINTMENT (OUTPATIENT)
Dept: NUCLEAR MEDICINE | Facility: CLINIC | Age: 39
End: 2024-11-27
Payer: COMMERCIAL

## 2024-11-27 ENCOUNTER — APPOINTMENT (OUTPATIENT)
Dept: CT IMAGING | Facility: CLINIC | Age: 39
End: 2024-11-27
Payer: COMMERCIAL

## 2024-11-27 PROCEDURE — 70491 CT SOFT TISSUE NECK W/DYE: CPT

## 2024-11-27 PROCEDURE — 78815 PET IMAGE W/CT SKULL-THIGH: CPT | Mod: PS

## 2024-11-27 PROCEDURE — A9552: CPT

## 2024-12-03 ENCOUNTER — APPOINTMENT (OUTPATIENT)
Dept: OTOLARYNGOLOGY | Facility: CLINIC | Age: 39
End: 2024-12-03
Payer: COMMERCIAL

## 2024-12-03 VITALS
DIASTOLIC BLOOD PRESSURE: 66 MMHG | WEIGHT: 150 LBS | OXYGEN SATURATION: 98 % | HEIGHT: 66 IN | SYSTOLIC BLOOD PRESSURE: 99 MMHG | HEART RATE: 66 BPM | BODY MASS INDEX: 24.11 KG/M2

## 2024-12-03 DIAGNOSIS — C06.9 MALIGNANT NEOPLASM OF MOUTH, UNSPECIFIED: ICD-10-CM

## 2024-12-03 PROCEDURE — 31575 DIAGNOSTIC LARYNGOSCOPY: CPT

## 2024-12-03 PROCEDURE — 99215 OFFICE O/P EST HI 40 MIN: CPT | Mod: 25

## 2025-03-01 NOTE — PATIENT PROFILE ADULT - OVER THE PAST TWO WEEKS, HAVE YOU FELT LITTLE INTEREST OR PLEASURE IN DOING THINGS?
no Coagulation: Bleeding disorder either through use of anticoagulants or underlying clinical condition(s)

## 2025-03-04 ENCOUNTER — APPOINTMENT (OUTPATIENT)
Dept: OTOLARYNGOLOGY | Facility: CLINIC | Age: 40
End: 2025-03-04
Payer: COMMERCIAL

## 2025-03-04 VITALS
WEIGHT: 133 LBS | DIASTOLIC BLOOD PRESSURE: 71 MMHG | OXYGEN SATURATION: 97 % | HEART RATE: 69 BPM | SYSTOLIC BLOOD PRESSURE: 108 MMHG | HEIGHT: 66 IN | BODY MASS INDEX: 21.38 KG/M2

## 2025-03-04 DIAGNOSIS — C06.9 MALIGNANT NEOPLASM OF MOUTH, UNSPECIFIED: ICD-10-CM

## 2025-03-04 DIAGNOSIS — R25.2 CRAMP AND SPASM: ICD-10-CM

## 2025-03-04 DIAGNOSIS — R13.10 DYSPHAGIA, UNSPECIFIED: ICD-10-CM

## 2025-03-04 DIAGNOSIS — I89.0 LYMPHEDEMA, NOT ELSEWHERE CLASSIFIED: ICD-10-CM

## 2025-03-04 PROCEDURE — 31575 DIAGNOSTIC LARYNGOSCOPY: CPT

## 2025-03-04 PROCEDURE — 99214 OFFICE O/P EST MOD 30 MIN: CPT | Mod: 25

## 2025-05-27 ENCOUNTER — APPOINTMENT (OUTPATIENT)
Dept: CT IMAGING | Facility: CLINIC | Age: 40
End: 2025-05-27
Payer: COMMERCIAL

## 2025-05-27 PROCEDURE — 70491 CT SOFT TISSUE NECK W/DYE: CPT

## 2025-05-27 PROCEDURE — 71260 CT THORAX DX C+: CPT

## 2025-06-03 ENCOUNTER — APPOINTMENT (OUTPATIENT)
Dept: OTOLARYNGOLOGY | Facility: CLINIC | Age: 40
End: 2025-06-03

## 2025-06-10 ENCOUNTER — APPOINTMENT (OUTPATIENT)
Dept: OTOLARYNGOLOGY | Facility: CLINIC | Age: 40
End: 2025-06-10
Payer: COMMERCIAL

## 2025-06-10 VITALS
WEIGHT: 133 LBS | HEIGHT: 66 IN | BODY MASS INDEX: 21.38 KG/M2 | DIASTOLIC BLOOD PRESSURE: 75 MMHG | HEART RATE: 68 BPM | SYSTOLIC BLOOD PRESSURE: 114 MMHG

## 2025-06-10 PROBLEM — R91.1 LUNG NODULE: Status: ACTIVE | Noted: 2025-06-10

## 2025-06-10 PROCEDURE — 99214 OFFICE O/P EST MOD 30 MIN: CPT | Mod: 25

## 2025-06-10 PROCEDURE — 31575 DIAGNOSTIC LARYNGOSCOPY: CPT

## 2025-09-02 ENCOUNTER — APPOINTMENT (OUTPATIENT)
Dept: OTOLARYNGOLOGY | Facility: CLINIC | Age: 40
End: 2025-09-02

## 2025-09-04 ENCOUNTER — APPOINTMENT (OUTPATIENT)
Dept: CT IMAGING | Facility: CLINIC | Age: 40
End: 2025-09-04

## 2025-09-04 PROCEDURE — 71250 CT THORAX DX C-: CPT

## 2025-09-16 ENCOUNTER — APPOINTMENT (OUTPATIENT)
Dept: OTOLARYNGOLOGY | Facility: CLINIC | Age: 40
End: 2025-09-16

## (undated) DEVICE — DRSG STERISTRIPS 0.25 X 4"

## (undated) DEVICE — GEL ULTRASOUND 0.25L

## (undated) DEVICE — DOPPLER PROBE  CABLE

## (undated) DEVICE — POSITIONER FOAM OR TABLE PAD CONVOLUTED (PINK)

## (undated) DEVICE — MERCIAN VISABILITY BACKROUND YELLOW

## (undated) DEVICE — DRAPE 3/4 SHEET 52X76"

## (undated) DEVICE — DRAIN BLAKE 10FR ROUND

## (undated) DEVICE — SOL IRR POUR H2O 500ML

## (undated) DEVICE — WOUND IRR SURGIPHOR

## (undated) DEVICE — SUT VICRYL 3-0 18" SH UNDYED (POP-OFF)

## (undated) DEVICE — SUCTION MICROMAT 3FR

## (undated) DEVICE — DRSG CURITY GAUZE SPONGE 4 X 4" 12-PLY

## (undated) DEVICE — LONE STAR RETRACTOR RING 12MM BLUNT DISP

## (undated) DEVICE — WARMING BLANKET FULL ADULT

## (undated) DEVICE — TOURNIQUET CUFF 24" DUAL PORT DUAL BLADDER W PLC (BLACK)

## (undated) DEVICE — DRAPE FLUID WARMER 44 X 66"

## (undated) DEVICE — SUT VICRYL 4-0 27" RB-1 UNDYED

## (undated) DEVICE — SYR LUER LOK 10CC

## (undated) DEVICE — BLADE SURGICAL #11 CARBON

## (undated) DEVICE — TRACH TIE MEDIUM

## (undated) DEVICE — SUT QUILL MONODERM 2-0 30CM 19MM

## (undated) DEVICE — CANISTER DISPOSABLE THIN WALL 3000CC

## (undated) DEVICE — BIPOLAR FORCEP CORD WECK STANDARD 12FT

## (undated) DEVICE — DRAPE INSTRUMENT POUCH 6.75" X 11"

## (undated) DEVICE — SUT SILK 3-0 18" TIES

## (undated) DEVICE — WARMING BLANKET FULL UNDERBODY

## (undated) DEVICE — POSITIONER STRAP ARMBOARD VELCRO TS-30

## (undated) DEVICE — Device

## (undated) DEVICE — ELCTR BOVIE PENCIL SMOKE EVACUATION

## (undated) DEVICE — DRAIN PENROSE 1" X 18" LATEX

## (undated) DEVICE — BIPOLAR FORCEP STRYKER STANDARD 8" X 1MM (YELLOW)

## (undated) DEVICE — LIJ-ZIMMER MESHGRAFTER: Type: DURABLE MEDICAL EQUIPMENT

## (undated) DEVICE — DRAIN RESERVOIR FOR JACKSON PRATT 100CC CARDINAL

## (undated) DEVICE — DRAPE TOWEL BLUE 17" X 24"

## (undated) DEVICE — SUT CHROMIC 4-0 18" PS-2

## (undated) DEVICE — STAPLER SKIN VISI-STAT 35 WIDE

## (undated) DEVICE — CLAMP MICROVASCULAR DOUBLE  1-2MM

## (undated) DEVICE — NDL HYPO SAFE 22G X 1" (BLACK)

## (undated) DEVICE — NDL COUNTER FOAM AND MAGNET 20-40

## (undated) DEVICE — ELCTR BOVIE TIP NEEDLE INSULATED 2.8" EDGE

## (undated) DEVICE — DRAPE SPLIT SHEET 77" X 120"

## (undated) DEVICE — DRSG WEBRIL 6"

## (undated) DEVICE — SYR LUER LOK 5CC

## (undated) DEVICE — SUT ETHILON 9-0 5" BV100-4

## (undated) DEVICE — PACK HEAD & NECK

## (undated) DEVICE — TONSIL ROLLS

## (undated) DEVICE — SUT SILK 2-0 18" TIES

## (undated) DEVICE — BASIN SET DOUBLE

## (undated) DEVICE — SUT ETHILON 6-0 18" PS-3

## (undated) DEVICE — DRSG XEROFORM 5 X 9"

## (undated) DEVICE — SUT PROLENE 5-0 36" RB-1

## (undated) DEVICE — TOURNIQUET ESMARK 6"

## (undated) DEVICE — DRSG TEGADERM 4X4.75"

## (undated) DEVICE — SUT ETHILON 8-0 5" BV130-5

## (undated) DEVICE — DRAPE ARMATEC HANDLE 5" X 10"

## (undated) DEVICE — GLV 8 PROTEXIS (WHITE)

## (undated) DEVICE — SUT VICRYL 3-0 27" SH UNDYED

## (undated) DEVICE — VENODYNE/SCD SLEEVE CALF MEDIUM

## (undated) DEVICE — DRSG ACE BANDAGE 4" NS

## (undated) DEVICE — PREP BETADINE KIT

## (undated) DEVICE — DRAPE SPLIT SHEET 77" X 108"

## (undated) DEVICE — SUT SILK 2-0 18" FS

## (undated) DEVICE — CANISTER W/GEL INFOVAC 500ML

## (undated) DEVICE — DRSG DERMABOND PRINEO 60CM

## (undated) DEVICE — SPONGE PEANUT AUTO COUNT

## (undated) DEVICE — DRAPE MAGNETIC INSTRUMENT MEDIUM

## (undated) DEVICE — GLV 7 PROTEXIS (WHITE)

## (undated) DEVICE — SUT ETHILON 5-0 18" PS-2

## (undated) DEVICE — FEEDING TUBE NG 12FR 36"

## (undated) DEVICE — TOURNIQUET CUFF 34" DUAL PORT W PLC

## (undated) DEVICE — FOLEY TRAY 16FR 5CC LF UMETER CLOSED

## (undated) DEVICE — DRAPE FLUID WARMER 44 X 44"

## (undated) DEVICE — ELCTR BOVIE TIP BLADE INSULATED 2.75" EDGE

## (undated) DEVICE — PROTECTOR HEEL / ELBOW FLUFFY

## (undated) DEVICE — TOURNIQUET CUFF 24" DUAL PORT SINGLE BLADDER LUER LOCK  (BLACK)

## (undated) DEVICE — GLV 7.5 PROTEXIS (WHITE)

## (undated) DEVICE — CANNULA ANT CHMBR 27GX22MM

## (undated) DEVICE — DRSG STOCKINETTE IMPERVIOUS LG

## (undated) DEVICE — TOURNIQUET CUFF 34" SINGLE PORT W PLC (BLACK)

## (undated) DEVICE — LABELS BLANK W PEN

## (undated) DEVICE — ELCTR GROUNDING PAD ADULT COVIDIEN

## (undated) DEVICE — SPEAR SURG EYE WECK-CELL CELOS

## (undated) DEVICE — ELCTR BOVIE TIP BLADE INSULATED 2.8" EDGE WITH SAFETY

## (undated) DEVICE — PACK FREE FLAP

## (undated) DEVICE — TOURNIQUET CUFF 18" DUAL PORT SINGLE BLADDER LUER LOCK (BLACK)

## (undated) DEVICE — SOL INJ NS 0.9% 1000ML

## (undated) DEVICE — PREP CHLORAPREP HI-LITE ORANGE 26ML

## (undated) DEVICE — GOWN LG

## (undated) DEVICE — SPEAR SURG WECKCEL

## (undated) DEVICE — TUBING SUCTION NONCONDUCTIVE 6MM X 12FT

## (undated) DEVICE — DRSG TELFA 3 X 8